# Patient Record
Sex: FEMALE | Race: OTHER | NOT HISPANIC OR LATINO | URBAN - METROPOLITAN AREA
[De-identification: names, ages, dates, MRNs, and addresses within clinical notes are randomized per-mention and may not be internally consistent; named-entity substitution may affect disease eponyms.]

---

## 2017-07-01 ENCOUNTER — OUTPATIENT (OUTPATIENT)
Dept: OUTPATIENT SERVICES | Facility: HOSPITAL | Age: 82
LOS: 1 days | End: 2017-07-01
Payer: MEDICAID

## 2017-07-18 DIAGNOSIS — R69 ILLNESS, UNSPECIFIED: ICD-10-CM

## 2017-09-01 PROCEDURE — G9001: CPT

## 2019-04-01 ENCOUNTER — OUTPATIENT (OUTPATIENT)
Dept: OUTPATIENT SERVICES | Facility: HOSPITAL | Age: 84
LOS: 1 days | End: 2019-04-01
Payer: MEDICARE

## 2019-04-08 ENCOUNTER — INPATIENT (INPATIENT)
Facility: HOSPITAL | Age: 84
LOS: 7 days | Discharge: HOME | DRG: 234 | End: 2019-04-16
Attending: THORACIC SURGERY (CARDIOTHORACIC VASCULAR SURGERY) | Admitting: STUDENT IN AN ORGANIZED HEALTH CARE EDUCATION/TRAINING PROGRAM
Payer: MEDICARE

## 2019-04-08 VITALS
RESPIRATION RATE: 18 BRPM | DIASTOLIC BLOOD PRESSURE: 69 MMHG | SYSTOLIC BLOOD PRESSURE: 141 MMHG | HEART RATE: 70 BPM | OXYGEN SATURATION: 98 %

## 2019-04-08 PROCEDURE — 99285 EMERGENCY DEPT VISIT HI MDM: CPT | Mod: GC,25

## 2019-04-08 PROCEDURE — 93010 ELECTROCARDIOGRAM REPORT: CPT

## 2019-04-09 DIAGNOSIS — Z29.9 ENCOUNTER FOR PROPHYLACTIC MEASURES, UNSPECIFIED: ICD-10-CM

## 2019-04-09 DIAGNOSIS — I25.10 ATHEROSCLEROTIC HEART DISEASE OF NATIVE CORONARY ARTERY WITHOUT ANGINA PECTORIS: ICD-10-CM

## 2019-04-09 DIAGNOSIS — R07.89 OTHER CHEST PAIN: ICD-10-CM

## 2019-04-09 DIAGNOSIS — E11.9 TYPE 2 DIABETES MELLITUS WITHOUT COMPLICATIONS: ICD-10-CM

## 2019-04-09 DIAGNOSIS — I21.4 NON-ST ELEVATION (NSTEMI) MYOCARDIAL INFARCTION: ICD-10-CM

## 2019-04-09 DIAGNOSIS — Z90.710 ACQUIRED ABSENCE OF BOTH CERVIX AND UTERUS: Chronic | ICD-10-CM

## 2019-04-09 LAB
ALBUMIN SERPL ELPH-MCNC: 3.8 G/DL — SIGNIFICANT CHANGE UP (ref 3.3–5)
ALP SERPL-CCNC: 95 U/L — SIGNIFICANT CHANGE UP (ref 40–120)
ALT FLD-CCNC: 26 U/L — SIGNIFICANT CHANGE UP (ref 10–45)
ANION GAP SERPL CALC-SCNC: 14 MMOL/L — SIGNIFICANT CHANGE UP (ref 5–17)
APTT BLD: 185.3 SEC — CRITICAL HIGH (ref 27.5–36.3)
APTT BLD: 36.3 SEC — SIGNIFICANT CHANGE UP (ref 27.5–36.3)
APTT BLD: >200 SEC — CRITICAL HIGH (ref 27.5–36.3)
AST SERPL-CCNC: 50 U/L — HIGH (ref 10–40)
BILIRUB SERPL-MCNC: 0.2 MG/DL — SIGNIFICANT CHANGE UP (ref 0.2–1.2)
BLD GP AB SCN SERPL QL: NEGATIVE — SIGNIFICANT CHANGE UP
BUN SERPL-MCNC: 12 MG/DL — SIGNIFICANT CHANGE UP (ref 7–23)
CALCIUM SERPL-MCNC: 9.6 MG/DL — SIGNIFICANT CHANGE UP (ref 8.4–10.5)
CHLORIDE SERPL-SCNC: 101 MMOL/L — SIGNIFICANT CHANGE UP (ref 96–108)
CO2 SERPL-SCNC: 24 MMOL/L — SIGNIFICANT CHANGE UP (ref 22–31)
CREAT SERPL-MCNC: 0.8 MG/DL — SIGNIFICANT CHANGE UP (ref 0.5–1.3)
GLUCOSE SERPL-MCNC: 146 MG/DL — HIGH (ref 70–99)
HCT VFR BLD CALC: 33.3 % — LOW (ref 34.5–45)
HCT VFR BLD CALC: 34.2 % — LOW (ref 34.5–45)
HGB BLD-MCNC: 10.7 G/DL — LOW (ref 11.5–15.5)
HGB BLD-MCNC: 10.8 G/DL — LOW (ref 11.5–15.5)
INR BLD: 1.07 RATIO — SIGNIFICANT CHANGE UP (ref 0.88–1.16)
MCHC RBC-ENTMCNC: 28 PG — SIGNIFICANT CHANGE UP (ref 27–34)
MCHC RBC-ENTMCNC: 28.5 PG — SIGNIFICANT CHANGE UP (ref 27–34)
MCHC RBC-ENTMCNC: 31.6 GM/DL — LOW (ref 32–36)
MCHC RBC-ENTMCNC: 32.1 GM/DL — SIGNIFICANT CHANGE UP (ref 32–36)
MCV RBC AUTO: 88.7 FL — SIGNIFICANT CHANGE UP (ref 80–100)
MCV RBC AUTO: 88.8 FL — SIGNIFICANT CHANGE UP (ref 80–100)
PA ADP PRP-ACNC: 163 PRU — LOW (ref 194–417)
PA ADP PRP-ACNC: 180 PRU — LOW (ref 194–417)
PLATELET # BLD AUTO: 249 K/UL — SIGNIFICANT CHANGE UP (ref 150–400)
PLATELET # BLD AUTO: 250 K/UL — SIGNIFICANT CHANGE UP (ref 150–400)
POTASSIUM SERPL-MCNC: 4.6 MMOL/L — SIGNIFICANT CHANGE UP (ref 3.5–5.3)
POTASSIUM SERPL-SCNC: 4.6 MMOL/L — SIGNIFICANT CHANGE UP (ref 3.5–5.3)
PROT SERPL-MCNC: 6.9 G/DL — SIGNIFICANT CHANGE UP (ref 6–8.3)
PROTHROM AB SERPL-ACNC: 12.3 SEC — SIGNIFICANT CHANGE UP (ref 10–12.9)
RBC # BLD: 3.75 M/UL — LOW (ref 3.8–5.2)
RBC # BLD: 3.86 M/UL — SIGNIFICANT CHANGE UP (ref 3.8–5.2)
RBC # FLD: 11.5 % — SIGNIFICANT CHANGE UP (ref 10.3–14.5)
RBC # FLD: 11.7 % — SIGNIFICANT CHANGE UP (ref 10.3–14.5)
RH IG SCN BLD-IMP: POSITIVE — SIGNIFICANT CHANGE UP
SODIUM SERPL-SCNC: 139 MMOL/L — SIGNIFICANT CHANGE UP (ref 135–145)
TROPONIN T, HIGH SENSITIVITY RESULT: 548 NG/L — HIGH (ref 0–51)
TROPONIN T, HIGH SENSITIVITY RESULT: 566 NG/L — HIGH (ref 0–51)
WBC # BLD: 5.4 K/UL — SIGNIFICANT CHANGE UP (ref 3.8–10.5)
WBC # BLD: 6.5 K/UL — SIGNIFICANT CHANGE UP (ref 3.8–10.5)
WBC # FLD AUTO: 5.4 K/UL — SIGNIFICANT CHANGE UP (ref 3.8–10.5)
WBC # FLD AUTO: 6.5 K/UL — SIGNIFICANT CHANGE UP (ref 3.8–10.5)

## 2019-04-09 PROCEDURE — 99222 1ST HOSP IP/OBS MODERATE 55: CPT | Mod: 24

## 2019-04-09 PROCEDURE — 99223 1ST HOSP IP/OBS HIGH 75: CPT | Mod: GC

## 2019-04-09 PROCEDURE — 99223 1ST HOSP IP/OBS HIGH 75: CPT

## 2019-04-09 PROCEDURE — 99152 MOD SED SAME PHYS/QHP 5/>YRS: CPT | Mod: GC

## 2019-04-09 PROCEDURE — 71045 X-RAY EXAM CHEST 1 VIEW: CPT | Mod: 26

## 2019-04-09 PROCEDURE — 99221 1ST HOSP IP/OBS SF/LOW 40: CPT

## 2019-04-09 PROCEDURE — 93458 L HRT ARTERY/VENTRICLE ANGIO: CPT | Mod: 26,GC

## 2019-04-09 PROCEDURE — 93306 TTE W/DOPPLER COMPLETE: CPT | Mod: 26

## 2019-04-09 RX ORDER — SODIUM CHLORIDE 9 MG/ML
1000 INJECTION, SOLUTION INTRAVENOUS
Qty: 0 | Refills: 0 | Status: DISCONTINUED | OUTPATIENT
Start: 2019-04-09 | End: 2019-04-12

## 2019-04-09 RX ORDER — HEPARIN SODIUM 5000 [USP'U]/ML
INJECTION INTRAVENOUS; SUBCUTANEOUS
Qty: 25000 | Refills: 0 | Status: DISCONTINUED | OUTPATIENT
Start: 2019-04-09 | End: 2019-04-09

## 2019-04-09 RX ORDER — DEXTROSE 50 % IN WATER 50 %
15 SYRINGE (ML) INTRAVENOUS ONCE
Qty: 0 | Refills: 0 | Status: DISCONTINUED | OUTPATIENT
Start: 2019-04-09 | End: 2019-04-12

## 2019-04-09 RX ORDER — DEXTROSE 50 % IN WATER 50 %
25 SYRINGE (ML) INTRAVENOUS ONCE
Qty: 0 | Refills: 0 | Status: DISCONTINUED | OUTPATIENT
Start: 2019-04-09 | End: 2019-04-12

## 2019-04-09 RX ORDER — CLOPIDOGREL BISULFATE 75 MG/1
75 TABLET, FILM COATED ORAL DAILY
Qty: 0 | Refills: 0 | Status: DISCONTINUED | OUTPATIENT
Start: 2019-04-09 | End: 2019-04-09

## 2019-04-09 RX ORDER — ATORVASTATIN CALCIUM 80 MG/1
80 TABLET, FILM COATED ORAL AT BEDTIME
Qty: 0 | Refills: 0 | Status: DISCONTINUED | OUTPATIENT
Start: 2019-04-09 | End: 2019-04-12

## 2019-04-09 RX ORDER — METOPROLOL TARTRATE 50 MG
12.5 TABLET ORAL
Qty: 0 | Refills: 0 | Status: DISCONTINUED | OUTPATIENT
Start: 2019-04-09 | End: 2019-04-12

## 2019-04-09 RX ORDER — ASPIRIN/CALCIUM CARB/MAGNESIUM 324 MG
81 TABLET ORAL DAILY
Qty: 0 | Refills: 0 | Status: DISCONTINUED | OUTPATIENT
Start: 2019-04-09 | End: 2019-04-12

## 2019-04-09 RX ORDER — INSULIN LISPRO 100/ML
VIAL (ML) SUBCUTANEOUS
Qty: 0 | Refills: 0 | Status: DISCONTINUED | OUTPATIENT
Start: 2019-04-09 | End: 2019-04-12

## 2019-04-09 RX ORDER — GLUCAGON INJECTION, SOLUTION 0.5 MG/.1ML
1 INJECTION, SOLUTION SUBCUTANEOUS ONCE
Qty: 0 | Refills: 0 | Status: DISCONTINUED | OUTPATIENT
Start: 2019-04-09 | End: 2019-04-12

## 2019-04-09 RX ORDER — INSULIN LISPRO 100/ML
VIAL (ML) SUBCUTANEOUS AT BEDTIME
Qty: 0 | Refills: 0 | Status: DISCONTINUED | OUTPATIENT
Start: 2019-04-09 | End: 2019-04-12

## 2019-04-09 RX ORDER — HEPARIN SODIUM 5000 [USP'U]/ML
700 INJECTION INTRAVENOUS; SUBCUTANEOUS
Qty: 25000 | Refills: 0 | Status: DISCONTINUED | OUTPATIENT
Start: 2019-04-09 | End: 2019-04-12

## 2019-04-09 RX ORDER — DEXTROSE 50 % IN WATER 50 %
12.5 SYRINGE (ML) INTRAVENOUS ONCE
Qty: 0 | Refills: 0 | Status: DISCONTINUED | OUTPATIENT
Start: 2019-04-09 | End: 2019-04-12

## 2019-04-09 RX ORDER — HEPARIN SODIUM 5000 [USP'U]/ML
4400 INJECTION INTRAVENOUS; SUBCUTANEOUS EVERY 6 HOURS
Qty: 0 | Refills: 0 | Status: DISCONTINUED | OUTPATIENT
Start: 2019-04-09 | End: 2019-04-12

## 2019-04-09 RX ADMIN — HEPARIN SODIUM 0 UNIT(S)/HR: 5000 INJECTION INTRAVENOUS; SUBCUTANEOUS at 05:20

## 2019-04-09 RX ADMIN — Medication 81 MILLIGRAM(S): at 18:02

## 2019-04-09 RX ADMIN — HEPARIN SODIUM 900 UNIT(S)/HR: 5000 INJECTION INTRAVENOUS; SUBCUTANEOUS at 01:14

## 2019-04-09 RX ADMIN — ATORVASTATIN CALCIUM 80 MILLIGRAM(S): 80 TABLET, FILM COATED ORAL at 21:45

## 2019-04-09 RX ADMIN — HEPARIN SODIUM 700 UNIT(S)/HR: 5000 INJECTION INTRAVENOUS; SUBCUTANEOUS at 22:52

## 2019-04-09 RX ADMIN — Medication 12.5 MILLIGRAM(S): at 18:02

## 2019-04-09 RX ADMIN — Medication 12.5 MILLIGRAM(S): at 08:50

## 2019-04-09 NOTE — ED ADULT NURSE REASSESSMENT NOTE - COMFORT CARE
warm blanket provided/side rails up/treatment delay explained/wait time explained/plan of care explained

## 2019-04-09 NOTE — ED PROVIDER NOTE - OBJECTIVE STATEMENT
83F with HTN, HLD, DM presented to [facility] with intermittent midsternal chest pain for a few days PTA. Associated with SOB. Patient had EKG done at [place] showing STD in I, II, III, avF, V4-V6 and patient was transferred to [facility] for cardiac eval. Patient arrived on heparin drip. 83F with HTN, HLD, DM presented to [facility] with intermittent midsternal chest pain for a few days PTA. Patient had EKG done at [place] showing STD in I, II, III, avF, V4-V6 and patient was transferred to [facility] for cardiac eval. Patient arrived on heparin drip. Patient states chest pain is substernal and only occurred when carrying heavy vegetables. She would then rest and the pain would go away. It has been happening for a few days, does not radiate anywhere. No associated SOB, lightheadedness, dizziness, palpitations, N/V. She currently reports no chest pain.

## 2019-04-09 NOTE — H&P ADULT - ATTENDING COMMENTS
Care to be assumed by day hospitalist at 8 am.  This patient was assigned to me by the hospitalist in charge; my involvement in this case has consisted of the initial history, physical, chart review, and management plan.  This patient was previously unknown to me.

## 2019-04-09 NOTE — ED PROVIDER NOTE - PHYSICAL EXAMINATION
GENERAL: NAD, well-developed  HEAD:  Atraumatic, Normocephalic  EYES: EOMI, PERRLA, conjunctiva and sclera clear  NECK: Supple, No JVD  CHEST/LUNG: Clear to auscultation bilaterally; No wheezes or rales  HEART: Regular rate and rhythm; No murmurs, rubs, or gallops  ABDOMEN: Soft, Nontender, Nondistended; Bowel sounds present  EXTREMITIES:  2+ Peripheral Pulses, No clubbing, cyanosis, or edema  PSYCH: AAOx3  NEUROLOGY: non-focal  SKIN: No rashes or lesions

## 2019-04-09 NOTE — ED ADULT NURSE REASSESSMENT NOTE - NS ED NURSE REASSESS COMMENT FT1
Heparin bag rescanned, infusion paused for 1 hour and decreased rate by 2ml/hr as per ACS Heparin Nomogram. Patient tolerating infusion without distress, no active chest pain. Resting comfortably in stretcher. Safety maintained.

## 2019-04-09 NOTE — H&P ADULT - HISTORY OF PRESENT ILLNESS
83 F PMH T2DM, HLD, Vit D deficiency, p/w one episode of acute onset chest pain today. Mid sternal, non radiating, sharp, precipitated by exertion (pt was carrying heavy grocery bags) and relieved by rest. Never had similar sx before. Denies associated sob, palpitations, LE edema, orthopnea.  NO prior provocative testing, no prior aspirin use. Unk baseline ekg. +FH early CAD with MI (brother, mother both with MI in 50s). Lifelong nonsmoker, no etoh or other toxic habits. Pt presented to [facility] where EKG showed signs of inferolateral ischemia, and troponin was elevated. s/p aspirin load, plavix load, hep gtt, and transferred to [facility] for cards eval and cardiac cath. Currently chest pain free, resting comfortably.     VS: 98.0, 70, 129/74, 18, 97% ra. Labs: anemia no baseline, rest cbc unrevealing, PTT initially supratherapeutic, repeat within range,  --> 548, cmp with hyperglycemia otherwise unrevealing.  CXR prelim clear lungs.

## 2019-04-09 NOTE — CONSULT NOTE ADULT - SUBJECTIVE AND OBJECTIVE BOX
CHIEF COMPLAINT: Chest pain    HISTORY OF PRESENT ILLNESS: 84 yo Turk[place] female w h/o DM2, HLD, family h/o CAD, transfer to [facility] for chest pain, abnormal EKG, elevated troponin. Pt reports intermittent chest pain and throat burning over past two days. Worse with exertion, relieved with rest. No dyspnea, dizziness, syncope, palpitations or peripheral edema. Denies any prior cardiac history or workup. Asymptomatic at this time. At outside hospital noted to have abnormal EKG and elevated troponin, transferred to [facility].      Allergies    No Known Allergies    Intolerances    	    MEDICATIONS:  heparin  Infusion.  Unit(s)/Hr IV Continuous <Continuous>  heparin  Injectable 4400 Unit(s) IV Push every 6 hours PRN                  PAST MEDICAL & SURGICAL HISTORY:  Hyperlipidemia  Diabetes mellitus  H/O abdominal hysterectomy      FAMILY HISTORY: CAD in brother and mother      SOCIAL HISTORY:    Denies tobacco, alcohol or drug use      REVIEW OF SYSTEMS:  General: no fatigue/malaise, weight loss/gain.  Skin: no rashes.  Ophthalmologic: no blurred vision, no loss of vision. 	  ENT: no sore throat, rhinorrhea, sinus congestion.  Respiratory: no SOB, cough or wheeze.  Gastrointestinal:  no N/V/D, no melena/hematemesis/hematochezia.  Genitourinary: no dysuria/hesitancy or hematuria.  Musculoskeletal: no myalgias or arthralgias.  Neurological: no changes in vision or hearing, no lightheadedness/dizziness, no syncope/near syncope	  Psychiatric: no unusual stress/anxiety.   Hematology/Lymphatics: no unusual bleeding, bruising and no lymphadenopathy.  Endocrine: no unusual thirst.   All others negative except as stated above and in HPI.    PHYSICAL EXAM:  T(C): 36.7 (04-09-19 @ 01:14), Max: 36.7 (04-09-19 @ 01:14)  HR: 70 (04-09-19 @ 01:14) (70 - 70)  BP: 129/74 (04-09-19 @ 01:14) (129/74 - 141/69)  RR: 18 (04-09-19 @ 01:14) (18 - 18)  SpO2: 97% (04-09-19 @ 01:14) (97% - 98%)  Wt(kg): --  I&O's Summary      Appearance: No distress	  HEENT:   Normal oral mucosa, PERRL, EOMI	  Lymphatic: No lymphadenopathy  Cardiovascular: Normal S1 S2, No JVD, No murmurs, No edema  Respiratory: Lungs clear to auscultation	  Psychiatry: A & O x 3, Mood & affect appropriate  Gastrointestinal:  Soft, Non-tender, + BS	  Skin: No rashes, No ecchymoses, No cyanosis	  Neurologic: Non-focal  Extremities: Normal range of motion, No clubbing, cyanosis or edema  Vascular: Peripheral pulses palpable 2+ bilaterally        LABS:	 	    CBC Full  -  ( 09 Apr 2019 01:20 )  WBC Count : 6.5 K/uL  Hemoglobin : 10.7 g/dL  Hematocrit : 33.3 %  Platelet Count - Automated : 250 K/uL  Mean Cell Volume : 88.8 fl  Mean Cell Hemoglobin : 28.5 pg  Mean Cell Hemoglobin Concentration : 32.1 gm/dL  Auto Neutrophil # : x  Auto Lymphocyte # : x  Auto Monocyte # : x  Auto Eosinophil # : x  Auto Basophil # : x  Auto Neutrophil % : x  Auto Lymphocyte % : x  Auto Monocyte % : x  Auto Eosinophil % : x  Auto Basophil % : x    04-09    139  |  101  |  12  ----------------------------<  146<H>  4.6   |  24  |  0.80    Ca    9.6      09 Apr 2019 01:20    TPro  6.9  /  Alb  3.8  /  TBili  0.2  /  DBili  x   /  AST  50<H>  /  ALT  26  /  AlkPhos  95  04-09      proBNP:   Lipid Profile:   HgA1c:   TSH:       CARDIAC MARKERS:            TELEMETRY: 	    ECG:  	At other hospital: Sinus HR 76 1st degree AV block 1mm ST elevation aVR ST depression I, II, III, aVL, aVF, V4-V6. EKG here: Sinus HR 67 1st degree AV block ST depression II, V4-V6  RADIOLOGY:  OTHER: 	    PREVIOUS DIAGNOSTIC TESTING:    [ ] Echocardiogram:   [ ]  Catheterization:   [ ] Stress Test:

## 2019-04-09 NOTE — CONSULT NOTE ADULT - ATTENDING COMMENTS
83 year old woman with unstable angina, elevated troponin, pain currently resolved, on Heparin infusion, DAPT, needs coronary angiography.

## 2019-04-09 NOTE — H&P ADULT - PROBLEM SELECTOR PLAN 1
-no current chest pain, some dynamic resolution of ST changes on EKG though still abnormal, HST appear to have peaked  -MITZY 4, HEART score 10.   -admit to tele  -cards recs appreciated: plan for C in am.  -c/w aspirin 81 qd, plavix 75 qd, heparin acs protocol  -start lipitor 80, metoprolol 12.5 bid  -trend ce  -check TTE  -check a1c, tsh, lipids

## 2019-04-09 NOTE — CONSULT NOTE ADULT - SUBJECTIVE AND OBJECTIVE BOX
History of Present Illness:  HPI:  83 F PMH T2DM, HLD, Vit D deficiency, p/w one episode of acute onset chest pain today. Mid sternal, non radiating, sharp, precipitated by exertion (pt was carrying heavy grocery bags) and relieved by rest. Never had similar sx before. Denies associated sob, palpitations, LE edema, orthopnea.  NO prior provocative testing, no prior aspirin use. Unk baseline ekg. +FH early CAD with MI (brother, mother both with MI in 50s). Lifelong nonsmoker, no etoh or other toxic habits. Pt presented to [facility] where EKG showed signs of inferolateral ischemia, and troponin was elevated. s/p aspirin load, plavix load, hep gtt, and transferred to [facility] for cards eval and cardiac cath. Currently chest pain free, resting comfortably.     VS: 98.0, 70, 129/74, 18, 97% ra. Labs: anemia no baseline, rest cbc unrevealing, PTT initially supratherapeutic, repeat within range,  --> 548, cmp with hyperglycemia otherwise unrevealing.  CXR prelim clear lungs. (09 Apr 2019 05:31)       Past Medical History  Hyperlipidemia  Diabetes mellitus      Past Surgical History  H/O abdominal hysterectomy      MEDICATIONS  (STANDING):  aspirin enteric coated 81 milliGRAM(s) Oral daily  atorvastatin 80 milliGRAM(s) Oral at bedtime  dextrose 5%. 1000 milliLiter(s) (50 mL/Hr) IV Continuous <Continuous>  dextrose 50% Injectable 12.5 Gram(s) IV Push once  dextrose 50% Injectable 25 Gram(s) IV Push once  dextrose 50% Injectable 25 Gram(s) IV Push once  heparin  Infusion.  Unit(s)/Hr (9 mL/Hr) IV Continuous <Continuous>  insulin lispro (HumaLOG) corrective regimen sliding scale   SubCutaneous three times a day before meals  insulin lispro (HumaLOG) corrective regimen sliding scale   SubCutaneous at bedtime  metoprolol tartrate 12.5 milliGRAM(s) Oral two times a day    MEDICATIONS  (PRN):  dextrose 40% Gel 15 Gram(s) Oral once PRN Blood Glucose LESS THAN 70 milliGRAM(s)/deciliter  glucagon  Injectable 1 milliGRAM(s) IntraMuscular once PRN Glucose LESS THAN 70 milligrams/deciliter  heparin  Injectable 4400 Unit(s) IV Push every 6 hours PRN For aPTT less than 40      Vital Signs Last 24 Hrs  T(C): 36.7 (04-09-19 @ 08:46), Max: 36.7 (04-09-19 @ 01:14)  T(F): 98 (04-09-19 @ 08:46), Max: 98 (04-09-19 @ 01:14)  HR: 65 (04-09-19 @ 08:46) (65 - 70)  BP: 139/72 (04-09-19 @ 08:46) (129/74 - 150/79)  RR: 20 (04-09-19 @ 08:46) (16 - 20)  SpO2: 100% (04-09-19 @ 08:46) (97% - 100%)           Daily     Daily   Admit Wt: Drug Dosing Weight    Weight (kg): 74.2 (09 Apr 2019 01:01)    Allergies: No Known Allergies      SOCIAL HISTORY:  Smoker: [ ] Yes  [X] No                 Pt denies  ETOH use: [ ] Yes  [X] No             Pt denies  Ilicit Drug use:  [ ] Yes  [X] No     Pt denies    FAMILY HISTORY:  FH: myocardial infarction: mother, brother      Review of Systems  GENERAL:  no weakness, fatigue, fevers or chills  NEURO: no dizziness, numbness, tingling or weakness  SKIN: no itching, burning, rashes, or lesions   HEENT: no visual changes;  no headache, no vertigo, no recent colds  RESPIRATORY: no shortness of breath, no cough, sputum, wheezing  CARDIOVASCULAR:  no chest pain,  or palpitations  GI: no abd pain. no N/V/D.  PERIPHERAL VASCULAR: no swelling, no tenderness, no erythema    PHYSICAL EXAM  General: Well nourished, well developed, NAD.                                              Neuro: Normal exam oriented to person/place & time with no focal motor or sensory  deficits.                    Eyes: Normal exam of conjunctiva & lids, pupils equally reactive.   ENT: Normal exam of nasal/oral mucosa with absence of cyanosis.   Neck: Normal exam of jugular veins, trachea & thyroid.   Chest: Normal lung exam with good air movement absence of wheezes, rales, or rhonchi.                                                                         CV:  Auscultation: normal S1S2, RRR   Carotids: No Bruits[X]  Abdominal Aorta: normal [X] nonpalpable[X]                                                                         GI: Normal exam of abdomen with no noted masses or tenderness. +BSx4Q                                                                                            Extremities: Normal no evidence of cyanosis or deformity, Edema: none  Lower Extremity Pulses: Right[+2DP] Left[+2DP] Varicosities[none]  SKIN : Normal exam to inspection & palpation.                                                           LABS:                        10.8   5.4   )-----------( 249      ( 09 Apr 2019 07:24 )             34.2     139  |  101  |  12  ----------------------------<  146<H>  4.6   |  24  |  0.80    AST  50<H>  /  ALT  26  /  AlkPhos  95  04-09    PT/INR/PTT - ( 09 Apr 2019 01:20 )   PT: 12.3 sec;   INR: 1.07 ratio   PTT:185.3 sec      Cardiac Cath:    TTE / JORGE: History of Present Illness:  84 y/o Female with PMH T2DM, HLD, Vit D deficiency, p/w one episode of acute onset chest pain today. Mid sternal, non radiating, sharp, precipitated by exertion (pt was carrying heavy grocery bags) and relieved by rest. Never had similar sx before. Denies associated sob, palpitations, LE edema, orthopnea.  NO prior provocative testing, no prior aspirin use. Unk baseline ekg. +FH early CAD with MI (brother, mother both with MI in 50s). Lifelong nonsmoker, no etoh or other toxic habits. Pt presented to [facility] where EKG showed signs of inferolateral ischemia, and troponin was elevated. s/p aspirin load, plavix load, hep gtt, and transferred to [facility] for cards eval and cardiac cath. Currently chest pain free, resting comfortably.   VS: 98.0, 70, 129/74, 18, 97% ra. Labs: anemia no baseline, rest cbc unrevealing, PTT initially supratherapeutic, repeat within range,  --> 548, cmp with hyperglycemia otherwise unrevealing.  CXR prelim clear lungs. (09 Apr 2019 05:31)       Past Medical History  Hyperlipidemia  Diabetes mellitus      Past Surgical History  H/O abdominal hysterectomy      MEDICATIONS  (STANDING):  aspirin enteric coated 81 milliGRAM(s) Oral daily  atorvastatin 80 milliGRAM(s) Oral at bedtime  heparin  Infusion.  Unit(s)/Hr (9 mL/Hr) IV Continuous <Continuous>  insulin lispro (HumaLOG) corrective regimen sliding scale   SubCutaneous three times a day before meals  insulin lispro (HumaLOG) corrective regimen sliding scale   SubCutaneous at bedtime  metoprolol tartrate 12.5 milliGRAM(s) Oral two times a day    MEDICATIONS  (PRN):  dextrose 40% Gel 15 Gram(s) Oral once PRN Blood Glucose LESS THAN 70 milliGRAM(s)/deciliter  glucagon  Injectable 1 milliGRAM(s) IntraMuscular once PRN Glucose LESS THAN 70 milligrams/deciliter  heparin  Injectable 4400 Unit(s) IV Push every 6 hours PRN For aPTT less than 40      Vital Signs Last 24 Hrs  T(C): 36.7 (04-09-19 @ 08:46), Max: 36.7 (04-09-19 @ 01:14)  T(F): 98 (04-09-19 @ 08:46), Max: 98 (04-09-19 @ 01:14)  HR: 65 (04-09-19 @ 08:46) (65 - 70)  BP: 139/72 (04-09-19 @ 08:46) (129/74 - 150/79)  RR: 20 (04-09-19 @ 08:46) (16 - 20)  SpO2: 100% (04-09-19 @ 08:46) (97% - 100%)             Weight (kg): 74.2 (09 Apr 2019 01:01)    Allergies: No Known Allergies      SOCIAL HISTORY:  Lives with daughter and son in law  Smoker: [ ] Yes  [X] No                 Pt denies  ETOH use: [ ] Yes  [X] No             Pt denies  Ilicit Drug use:  [ ] Yes  [X] No     Pt denies    FAMILY HISTORY:  FH: myocardial infarction: mother, brother      Review of Systems  GENERAL:  no weakness, fatigue, fevers or chills  NEURO: no dizziness, numbness, tingling or weakness  SKIN: no itching, burning, rashes, or lesions   HEENT: no visual changes;  no headache, no vertigo, no recent colds  RESPIRATORY: no shortness of breath, no cough, sputum, wheezing  CARDIOVASCULAR:  no chest pain,  or palpitations  GI: no abd pain. no N/V/D.  PERIPHERAL VASCULAR: no swelling, no tenderness, no erythema    PHYSICAL EXAM  General: Well nourished, well developed, NAD.                                              Neuro: Normal exam oriented to person/place & time with no focal motor or sensory  deficits.                    Eyes: Normal exam of conjunctiva & lids, pupils equally reactive.   ENT: Normal exam of nasal/oral mucosa with absence of cyanosis.   Neck: Normal exam of jugular veins, trachea & thyroid.   Chest: Normal lung exam with good air movement absence of wheezes, rales, or rhonchi.                                                                         CV:  Auscultation: normal S1S2, RRR   Carotids: No Bruits[X]  Abdominal Aorta: normal [X] nonpalpable[X]                                                                         GI: Normal exam of abdomen with no noted masses or tenderness. +BSx4Q                                                                                            Extremities: Normal no evidence of cyanosis or deformity, Edema: none  Lower Extremity Pulses: Right[+2DP] Left[+2DP] Varicosities[none]  SKIN : Normal exam to inspection & palpation. Right radial incision w/radial band CDI                                                          LABS:                        10.8   5.4   )-----------( 249      ( 09 Apr 2019 07:24 )             34.2     139  |  101  |  12  ----------------------------<  146<H>  4.6   |  24  |  0.80    AST  50<H>  /  ALT  26  /  AlkPhos  95  04-09    PT/INR/PTT - ( 09 Apr 2019 01:20 )   PT: 12.3 sec;   INR: 1.07 ratio   PTT:185.3 sec      < from: Cardiac Cath Lab - Adult (04.09.19 @ 11:26) >  LM:   --  LM: Angiography showed minor luminal irregularities with no flow  limiting lesions.  LAD:   --  Proximal LAD: There was a 99 % stenosis.  CX:   --Mid circumflex: There was a 90 % stenosis.  RCA:   --  RCA: Angiography showed minor luminal irregularities with no  flow limiting lesions.      TTE: pending

## 2019-04-09 NOTE — CHART NOTE - NSCHARTNOTEFT_GEN_A_CORE
Patient was seen and examined by me at bedside. I agree with nocturnist note, subjective, objective physical exam, assessment and plan with following modifications/additions.      Patient found to have acute NSTEMI- Catheterization revealed LAD:   --  Proximal LAD: There was a 99 % stenosis.  CX:   --  Mid circumflex: There was a 90 % stenosis.    CT surgery consulted for evaluation for CABG.  Given patient received Plavix will need to check response, before making decision about when procedure can be done.      Heparin to restarted at 9 pm.  No bleeding from radial access site

## 2019-04-09 NOTE — H&P ADULT - NSHPLABSRESULTS_GEN_ALL_CORE
Labs personally reviewed  anemia no baseline, rest cbc unrevealing, PTT initially supratherapeutic, repeat within range,  --> 548, cmp with hyperglycemia otherwise unrevealing.   Imaging personally reviewed  CXR prelim clear lungs.   OSH personally reviewed: Sinus HR 76 1st degree AV block 1mm ST elevation aVR ST depression I, II, III, aVL, aVF, V4-V6  In house EKG personally reviewed Sinus HR 67 1st degree AV block ST depression II, V4-V6 with AVR ROMINA resolved.

## 2019-04-09 NOTE — H&P ADULT - NSHPPHYSICALEXAM_GEN_ALL_CORE
PHYSICAL EXAM:  GENERAL: NAD, well-groomed, well-developed  HEAD:  Atraumatic, Normocephalic  EYES: EOMI, PERRLA, conjunctiva and sclera clear  ENMT: No tonsillar erythema, exudates, or enlargement; Moist mucous membranes, Good dentition, No lesions  NECK: Supple, No JVD, Normal thyroid  CHEST/LUNG: Clear to percussion bilaterally; No rales, rhonchi, wheezing, or rubs no resp distress or acc muscle usage, talking in full sentences  HEART: Regular rate and rhythm; No murmurs, rubs, or gallops no sig Le edema  ABDOMEN: Soft, Nontender, Nondistended; Bowel sounds present no rebound/guarding, neg kim  EXTREMITIES:  2+ Peripheral Pulses, No clubbing, cyanosis, rom intact  LYMPH: No lymphadenopathy noted, no lymphangitis  SKIN: No rashes or lesions, no excoriations  NERVOUS SYSTEM:  Alert & Oriented X3, Good concentration; Motor Strength 5/5 B/L upper and lower extremities

## 2019-04-09 NOTE — H&P ADULT - NSHPREVIEWOFSYSTEMS_GEN_ALL_CORE
REVIEW OF SYSTEMS:  CONSTITUTIONAL: No weakness. No fevers. No chills. No weight loss. Good appetite.  EYES: No visual changes. No eye pain.  ENT: No hearing difficulty. No vertigo. No dysphagia. No Sinusitis/rhinorrhea.  NECK: No pain. No stiffness/rigidity.  CARDIAC: resolved chest pain. No palpitations.  RESPIRATORY: No cough. No SOB. No hemoptysis.  GASTROINTESTINAL: No abdominal pain. No nausea. No vomiting. No hematemesis. No diarrhea. No constipation. No melena. No hematochezia.  GENITOURINARY: No dysuria. No frequency. No hesitancy. No hematuria.  NEUROLOGICAL: No numbness. No focal weakness. No incontinence. No headache.  BACK: No back pain.  EXTREMITIES: No lower extremity edema. Full ROM.  SKIN: No rashes. No itching. No other lesions.  PSYCHIATRIC: No depression. No anxiety. No SI/HI.  ALLERGIC: No lip swelling. No hives.  All other review of systems is negative unless indicated above.

## 2019-04-09 NOTE — ED ADULT NURSE NOTE - OBJECTIVE STATEMENT
82 y/o female with PMH diabetes, HTN, high cholesterol, arrives to ED by EMS transferred from [facility] for ST depressions on EKG. Patient reports having midsternal chest pain, non radiating, earlier in the day, with associated shortness of breath prompting patient to go to [facility]. Patient initiated on Heparin Infusion at transferring hospital arrives with pump set to 6.9 ml/hr. Patient reweighed upon arrival. Heparin infusion ordered by ED MD, dosage adjusted per patients weight using ACS Heparin Nomogram. Patient is a/ox4, denies having active chest pain, patient well appearing. No palpitations, abdomen pain, n/v/d. VSS, afebrile. IV 20g to right AC from [facility]. CM in place HR 70's NSR.

## 2019-04-09 NOTE — CONSULT NOTE ADULT - PROBLEM SELECTOR RECOMMENDATION 9
Continue asa 81 daily, lopressor 12.5 BID, atorvastatin 80 HS  Titrate up beta-blocker as tolerated  Continue pre-op cardiac surgery workup  Check TTE/US Carotid/PFTs  Check STAT P2Y12 - hold Plavix  Continue Heparin drip  Plan for CABG possible Thursday if p2y12 ok

## 2019-04-09 NOTE — CONSULT NOTE ADULT - ASSESSMENT
82 yo Citizen of the [place] female w h/o DM2, HLD, family h/o CAD, transfer to [facility] for chest pain, abnormal EKG, NSTEMI.    - Hemodynamically stable at this time. No chest pain  - At outside hospital received , Plavix 600 and was started on heparin gtt. c/w ASA 81 daily, Plavix 75 daily and heparin gtt  - Plan for cath in AM  - Trend cardiac enzymes  - Monitor on tele  - Start Lipitor 80  - Start Metoprolol 12.5 BID  - If any change in clinical status please call me back to bedside    Nitesh Watkins MD  Cardiology Fellow  [id number]
84 y/o Female with PMHx of T2DM, HLD, Vit D deficiency, who presented to [facility] with c/o acute onset chest pain. +EKG changes s/p aspirin load, plavix load, hep gtt, and transferred to [facility] for cards eval and cardiac cath. 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

## 2019-04-09 NOTE — ED ADULT NURSE NOTE - INTERVENTIONS DEFINITIONS
Monitor gait and stability/[place] to call system/Non-slip footwear when patient is off stretcher/Physically safe environment: no spills, clutter or unnecessary equipment/Call bell, personal items and telephone within reach/Provide visual cue, wrist band, yellow gown, etc./Monitor for mental status changes and reorient to person, place, and time/Stretcher in lowest position, wheels locked, appropriate side rails in place

## 2019-04-09 NOTE — H&P ADULT - ASSESSMENT
83 F PMH T2DM, HLD, Vit D deficiency, p/w one episode of acute onset chest pain today, f/w NSTEMI, pending [facility].

## 2019-04-09 NOTE — ED PROVIDER NOTE - CLINICAL SUMMARY MEDICAL DECISION MAKING FREE TEXT BOX
Mela: 83 year old female with htn, hld, dm presents ot the ER with chest pain from OSH. patient found to have STD with chest pain. will get labs, ekg, cards eval, patient on heparin drip, currently no cp. will admit to hospital.

## 2019-04-09 NOTE — H&P ADULT - NSHPSOCIALHISTORY_GEN_ALL_CORE
Social History:      Occupation: retired      Substance Use (street drugs): (  x) never used  (  ) other:  Tobacco Usage:  (  x ) never smoked   (   ) former smoker   (   ) current smoker  (     ) pack year  (        ) last cigarette date  Alcohol Usage: denies

## 2019-04-10 DIAGNOSIS — Z71.89 OTHER SPECIFIED COUNSELING: ICD-10-CM

## 2019-04-10 PROBLEM — Z00.00 ENCOUNTER FOR PREVENTIVE HEALTH EXAMINATION: Status: ACTIVE | Noted: 2019-04-10

## 2019-04-10 PROBLEM — E11.9 TYPE 2 DIABETES MELLITUS WITHOUT COMPLICATIONS: Chronic | Status: ACTIVE | Noted: 2019-04-09

## 2019-04-10 PROBLEM — E78.5 HYPERLIPIDEMIA, UNSPECIFIED: Chronic | Status: ACTIVE | Noted: 2019-04-09

## 2019-04-10 LAB
ANION GAP SERPL CALC-SCNC: 13 MMOL/L — SIGNIFICANT CHANGE UP (ref 5–17)
APTT BLD: 64.7 SEC — HIGH (ref 27.5–36.3)
APTT BLD: 66.3 SEC — HIGH (ref 27.5–36.3)
APTT BLD: 90.8 SEC — HIGH (ref 27.5–36.3)
BLD GP AB SCN SERPL QL: NEGATIVE — SIGNIFICANT CHANGE UP
BUN SERPL-MCNC: 18 MG/DL — SIGNIFICANT CHANGE UP (ref 7–23)
CALCIUM SERPL-MCNC: 9.7 MG/DL — SIGNIFICANT CHANGE UP (ref 8.4–10.5)
CHLORIDE SERPL-SCNC: 99 MMOL/L — SIGNIFICANT CHANGE UP (ref 96–108)
CHOLEST SERPL-MCNC: 188 MG/DL — SIGNIFICANT CHANGE UP (ref 10–199)
CO2 SERPL-SCNC: 24 MMOL/L — SIGNIFICANT CHANGE UP (ref 22–31)
CREAT SERPL-MCNC: 0.9 MG/DL — SIGNIFICANT CHANGE UP (ref 0.5–1.3)
FIBRINOGEN PPP-MCNC: 614 MG/DL — HIGH (ref 350–510)
GLUCOSE SERPL-MCNC: 172 MG/DL — HIGH (ref 70–99)
HBA1C BLD-MCNC: 8.1 % — HIGH (ref 4–5.6)
HCT VFR BLD CALC: 35.4 % — SIGNIFICANT CHANGE UP (ref 34.5–45)
HDLC SERPL-MCNC: 46 MG/DL — LOW
HGB BLD-MCNC: 11.8 G/DL — SIGNIFICANT CHANGE UP (ref 11.5–15.5)
LIPID PNL WITH DIRECT LDL SERPL: 111 MG/DL — HIGH
MCHC RBC-ENTMCNC: 30 PG — SIGNIFICANT CHANGE UP (ref 27–34)
MCHC RBC-ENTMCNC: 33.3 GM/DL — SIGNIFICANT CHANGE UP (ref 32–36)
MCV RBC AUTO: 90 FL — SIGNIFICANT CHANGE UP (ref 80–100)
MRSA PCR RESULT.: SIGNIFICANT CHANGE UP
NT-PROBNP SERPL-SCNC: 1759 PG/ML — HIGH (ref 0–300)
PA ADP PRP-ACNC: 260 PRU — SIGNIFICANT CHANGE UP (ref 194–417)
PLATELET # BLD AUTO: 252 K/UL — SIGNIFICANT CHANGE UP (ref 150–400)
POTASSIUM SERPL-MCNC: 4.6 MMOL/L — SIGNIFICANT CHANGE UP (ref 3.5–5.3)
POTASSIUM SERPL-SCNC: 4.6 MMOL/L — SIGNIFICANT CHANGE UP (ref 3.5–5.3)
RBC # BLD: 3.93 M/UL — SIGNIFICANT CHANGE UP (ref 3.8–5.2)
RBC # FLD: 12 % — SIGNIFICANT CHANGE UP (ref 10.3–14.5)
RH IG SCN BLD-IMP: POSITIVE — SIGNIFICANT CHANGE UP
S AUREUS DNA NOSE QL NAA+PROBE: SIGNIFICANT CHANGE UP
SODIUM SERPL-SCNC: 136 MMOL/L — SIGNIFICANT CHANGE UP (ref 135–145)
T3 SERPL-MCNC: 90 NG/DL — SIGNIFICANT CHANGE UP (ref 80–200)
T4 AB SER-ACNC: 5.1 UG/DL — SIGNIFICANT CHANGE UP (ref 4.6–12)
TOTAL CHOLESTEROL/HDL RATIO MEASUREMENT: 4.1 RATIO — SIGNIFICANT CHANGE UP (ref 3.3–7.1)
TRIGL SERPL-MCNC: 153 MG/DL — HIGH (ref 10–149)
TSH SERPL-MCNC: 5.43 UIU/ML — HIGH (ref 0.27–4.2)
WBC # BLD: 6.3 K/UL — SIGNIFICANT CHANGE UP (ref 3.8–10.5)
WBC # FLD AUTO: 6.3 K/UL — SIGNIFICANT CHANGE UP (ref 3.8–10.5)

## 2019-04-10 PROCEDURE — 93880 EXTRACRANIAL BILAT STUDY: CPT | Mod: 26

## 2019-04-10 PROCEDURE — 94010 BREATHING CAPACITY TEST: CPT | Mod: 26

## 2019-04-10 PROCEDURE — 99232 SBSQ HOSP IP/OBS MODERATE 35: CPT

## 2019-04-10 PROCEDURE — 99233 SBSQ HOSP IP/OBS HIGH 50: CPT | Mod: GC

## 2019-04-10 RX ORDER — CHLORHEXIDINE GLUCONATE 213 G/1000ML
30 SOLUTION TOPICAL ONCE
Qty: 0 | Refills: 0 | Status: DISCONTINUED | OUTPATIENT
Start: 2019-04-10 | End: 2019-04-10

## 2019-04-10 RX ORDER — CEFUROXIME AXETIL 250 MG
1500 TABLET ORAL ONCE
Qty: 0 | Refills: 0 | Status: DISCONTINUED | OUTPATIENT
Start: 2019-04-10 | End: 2019-04-10

## 2019-04-10 RX ORDER — CHLORHEXIDINE GLUCONATE 213 G/1000ML
1 SOLUTION TOPICAL ONCE
Qty: 0 | Refills: 0 | Status: DISCONTINUED | OUTPATIENT
Start: 2019-04-10 | End: 2019-04-10

## 2019-04-10 RX ADMIN — Medication 81 MILLIGRAM(S): at 12:36

## 2019-04-10 RX ADMIN — Medication 1: at 18:06

## 2019-04-10 RX ADMIN — Medication 1: at 13:22

## 2019-04-10 RX ADMIN — Medication 12.5 MILLIGRAM(S): at 18:07

## 2019-04-10 RX ADMIN — HEPARIN SODIUM 0 UNIT(S)/HR: 5000 INJECTION INTRAVENOUS; SUBCUTANEOUS at 06:04

## 2019-04-10 RX ADMIN — HEPARIN SODIUM 550 UNIT(S)/HR: 5000 INJECTION INTRAVENOUS; SUBCUTANEOUS at 13:32

## 2019-04-10 RX ADMIN — ATORVASTATIN CALCIUM 80 MILLIGRAM(S): 80 TABLET, FILM COATED ORAL at 21:02

## 2019-04-10 RX ADMIN — Medication 12.5 MILLIGRAM(S): at 05:15

## 2019-04-10 RX ADMIN — HEPARIN SODIUM 550 UNIT(S)/HR: 5000 INJECTION INTRAVENOUS; SUBCUTANEOUS at 06:50

## 2019-04-10 RX ADMIN — HEPARIN SODIUM 550 UNIT(S)/HR: 5000 INJECTION INTRAVENOUS; SUBCUTANEOUS at 21:01

## 2019-04-10 NOTE — PROGRESS NOTE ADULT - SUBJECTIVE AND OBJECTIVE BOX
Overnight Events: S/p LHC showing multivessel disease. No CP, SOB.    Medications:  aspirin enteric coated 81 milliGRAM(s) Oral daily  atorvastatin 80 milliGRAM(s) Oral at bedtime  dextrose 40% Gel 15 Gram(s) Oral once PRN  dextrose 5%. 1000 milliLiter(s) IV Continuous <Continuous>  dextrose 50% Injectable 12.5 Gram(s) IV Push once  dextrose 50% Injectable 25 Gram(s) IV Push once  dextrose 50% Injectable 25 Gram(s) IV Push once  glucagon  Injectable 1 milliGRAM(s) IntraMuscular once PRN  heparin  Infusion. 700 Unit(s)/Hr IV Continuous <Continuous>  heparin  Injectable 4400 Unit(s) IV Push every 6 hours PRN  insulin lispro (HumaLOG) corrective regimen sliding scale   SubCutaneous three times a day before meals  insulin lispro (HumaLOG) corrective regimen sliding scale   SubCutaneous at bedtime  metoprolol tartrate 12.5 milliGRAM(s) Oral two times a day      REVIEW OF SYSTEMS:  CONSTITUTIONAL: No weakness, fevers or chills  EYES/ENT: No visual changes;  No vertigo or throat pain   NECK: No pain or stiffness  RESPIRATORY: No cough, wheezing, hemoptysis; No shortness of breath  CARDIOVASCULAR: No chest pain or palpitations  GASTROINTESTINAL: No abdominal pain. No nausea, vomiting, or hematemesis; No diarrhea or constipation. No melena or hematochezia.  GENITOURINARY: No dysuria, frequency or hematuria  NEUROLOGICAL: No numbness or weakness  SKIN: No itching or rash  All other review of systems is negative unless indicated above    Vitals:  T(F): 97.9 (04-10), Max: 98.1 (04-09)  HR: 63 (04-10) (63 - 81)  BP: 148/55 (04-10) (130/62 - 174/92)  RR: 18 (04-10)  SpO2: 100% (04-10)  I&O's Summary    09 Apr 2019 07:01  -  10 Apr 2019 06:55  --------------------------------------------------------  IN: 296 mL / OUT: 0 mL / NET: 296 mL      Physical Exam:  Appearance: No acute distress; well appearing  Eyes: PERRL, EOMI, pink conjunctiva  HENT: Normal oral muscosa  Cardiovascular: RRR, S1, S2, no murmurs, rubs, or gallops; no edema; no JVD  Respiratory: Clear to auscultation bilaterally  Gastrointestinal: soft, non-tender, non-distended with normal bowel sounds  Musculoskeletal: No clubbing; no joint deformity   Neurologic: Non-focal  Lymphatic: No lymphadenopathy  Psychiatry: AAOx3, mood & affect appropriate  Skin: No rashes, ecchymoses, or cyanosis                          11.8   6.3   )-----------( 252      ( 10 Apr 2019 05:42 )             35.4     04-09    139  |  101  |  12  ----------------------------<  146<H>  4.6   |  24  |  0.80    Ca    9.6      09 Apr 2019 01:20    TPro  6.9  /  Alb  3.8  /  TBili  0.2  /  DBili  x   /  AST  50<H>  /  ALT  26  /  AlkPhos  95  04-09    PT/INR - ( 09 Apr 2019 01:20 )   PT: 12.3 sec;   INR: 1.07 ratio         PTT - ( 10 Apr 2019 05:41 )  PTT:90.8 sec    Interpretation of Telemetry:  SR 60-60      < from: Transthoracic Echocardiogram (04.09.19 @ 16:29) >  1. Mitral annular calcification and calcified mitral  leaflets with normal diastolic opening. Moderate mitral  regurgitation.  2. Calcified trileafletaortic valve with normal opening.  No aortic valve regurgitation seen.  3. Normal left ventricular systolic function. No segmental  wall motion abnormalities.  4. Normal right ventricular size and function.    < end of copied text >

## 2019-04-10 NOTE — PROGRESS NOTE ADULT - ASSESSMENT
84 y/o Female with PMHx of T2DM, HLD, Vit D deficiency, who presented to [facility] with c/o acute onset chest pain. +EKG changes s/p aspirin load, plavix load, hep gtt, and transferred to [facility] for cards eval and cardiac cath. 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  Hospital Course:   Pre op cardiac surgery work up in progress. 84 y/o Female with PMHx of T2DM, HLD, Vit D deficiency, who presented to [facility] with c/o acute onset chest pain. +EKG changes s/p aspirin load, plavix load, hep gtt, and transferred to [facility] for cards eval and cardiac cath. 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  Hospital Course:   Pre op cardiac surgery work up in progress.   Cardiac surgery re-scheduled for Friday 4/12 with Dr. Barba

## 2019-04-10 NOTE — PROGRESS NOTE ADULT - ASSESSMENT
83 F PMH T2DM, HLD, Vit D deficiency, p/w one episode of acute onset chest pain today, f/w NSTEMI, pending [facility].

## 2019-04-10 NOTE — PROGRESS NOTE ADULT - ASSESSMENT
84 yo [place] female w h/o DM2, HLD, family h/o CAD with NSTEMI found to have MVD. TTE showing preserved LV function with moderate MR.    NSTEMI. S/p LHC showing MVD. CT surgery following.  - Hemodynamically stable at this time. No chest pain  - Cont ASA, statin  - Hold P2Y12 inhibitor  - heparin gtt for 48 horus  - tentative plan for CABG on Thurs  - Cont metoprolol tartrate 12.5 BID    Moderate MR.   - Consider addressing at time of CABG      Lonnie Gold MD  Cardiology Fellow [id number] 84 yo [place] female w h/o DM2, HLD, family h/o CAD with NSTEMI found to have MVD. TTE showing preserved LV function with moderate MR.    NSTEMI. S/p LHC showing MVD. CT surgery following.  - Hemodynamically stable at this time. No chest pain  - Cont ASA, statin  - Hold P2Y12 inhibitor  - heparin gtt for 48 horus  - tentative plan for CABG on Thurs  - Cont metoprolol tartrate 12.5 BID    Moderate MR.       Lonnie Gold MD  Cardiology Fellow [id number]

## 2019-04-10 NOTE — PROGRESS NOTE ADULT - PROBLEM SELECTOR PLAN 1
-no current chest pain,  s/p aspirin and Plavix load, still currently on heparin gtt.  Cardiac cath showed multivessel CAD, patient needs CABG, schedule pending.    -start Lipitor 80, metoprolol 12.5 bid  - CABG workup ongoing.

## 2019-04-10 NOTE — PROGRESS NOTE ADULT - PROBLEM SELECTOR PLAN 1
Pre op Cardiac surgery work up in progress   Continue with ASA 81 mg PO Daily   Continue with Lopressor 12.5 mg PO BID   Continue with Lipitor 80 mg PO HS   NPO after midnight   Hibiclens Shower tonight at 8 pm   Heparin gtt on call to OR for ACS   Plan for Cardiac Surgery in AM with Dr. Barba Pre op Cardiac surgery work up in progress   Continue with ASA 81 mg PO Daily   Continue with Lopressor 12.5 mg PO BID   Continue with Lipitor 80 mg PO HS   Heparin gtt on call to OR for ACS   Plan for Cardiac Surgery in AM with Dr. Barba on Friday 4/12

## 2019-04-10 NOTE — PROGRESS NOTE ADULT - SUBJECTIVE AND OBJECTIVE BOX
Cardiac Surgery Pre-op Note:    CC: Patient is a 83y old  Female who presents with a chief complaint of NSTEMI (10 Apr 2019 12:45)      Referring Physician: Dr. Wang                                                                                                            Surgeon: Dr. Barba     Procedure: () (CABG)    Allergies    No Known Allergies    Intolerances    HPI:  83 F PMH T2DM, HLD, Vit D deficiency, p/w one episode of acute onset chest pain today. Mid sternal, non radiating, sharp, precipitated by exertion (pt was carrying heavy grocery bags) and relieved by rest. Never had similar sx before. Denies associated sob, palpitations, LE edema, orthopnea.  NO prior provocative testing, no prior aspirin use. Unk baseline ekg. +FH early CAD with MI (brother, mother both with MI in 50s). Lifelong nonsmoker, no etoh or other toxic habits. Pt presented to [facility] where EKG showed signs of inferolateral ischemia, and troponin was elevated. s/p aspirin load, plavix load, hep gtt, and transferred to [facility] for cards eval and cardiac cath. Currently chest pain free, resting comfortably.     VS: 98.0, 70, 129/74, 18, 97% ra. Labs: anemia no baseline, rest cbc unrevealing, PTT initially supratherapeutic, repeat within range,  --> 548, cmp with hyperglycemia otherwise unrevealing.  CXR prelim clear lungs. (2019 05:31)      PAST MEDICAL & SURGICAL HISTORY:  Hyperlipidemia  Diabetes mellitus  H/O abdominal hysterectomy    MEDICATIONS  (STANDING):  aspirin enteric coated 81 milliGRAM(s) Oral daily  atorvastatin 80 milliGRAM(s) Oral at bedtime  cefuroxime  IVPB 1500 milliGRAM(s) IV Intermittent once  chlorhexidine 0.12% Liquid 30 milliLiter(s) Swish and Spit once  chlorhexidine 4% Liquid 1 Application(s) Topical once  dextrose 5%. 1000 milliLiter(s) (50 mL/Hr) IV Continuous <Continuous>  dextrose 50% Injectable 12.5 Gram(s) IV Push once  dextrose 50% Injectable 25 Gram(s) IV Push once  dextrose 50% Injectable 25 Gram(s) IV Push once  heparin  Infusion. 700 Unit(s)/Hr (7 mL/Hr) IV Continuous <Continuous>  insulin lispro (HumaLOG) corrective regimen sliding scale   SubCutaneous three times a day before meals  insulin lispro (HumaLOG) corrective regimen sliding scale   SubCutaneous at bedtime  metoprolol tartrate 12.5 milliGRAM(s) Oral two times a day    MEDICATIONS  (PRN):  dextrose 40% Gel 15 Gram(s) Oral once PRN Blood Glucose LESS THAN 70 milliGRAM(s)/deciliter  glucagon  Injectable 1 milliGRAM(s) IntraMuscular once PRN Glucose LESS THAN 70 milligrams/deciliter  heparin  Injectable 4400 Unit(s) IV Push every 6 hours PRN For aPTT less than 40      Vital Signs Last 24 Hrs  T(C): 36.7 (04-10-19 @ 14:38), Max: 36.8 (04-10-19 @ 14:01)  T(F): 98.1 (04-10-19 @ 14:38), Max: 98.3 (04-10-19 @ 14:01)  HR: 88 (04-10-19 @ 14:38) (63 - 88)  BP: 112/68 (04-10-19 @ 14:38) (112/68 - 174/92)  RR: 20 (04-10-19 @ 14:38) (18 - 20)  SpO2: 99% (04-10-19 @ 14:38) (98% - 100%)          ABO Interpretation: B (04-10 @ 06:06)     Daily Height in cm: 157.48 (10 Apr 2019 05:48)    Daily Weight in k.7 (10 Apr 2019 04:46)  Admit Wt: Drug Dosing Weight  Height (cm): 157.48 (10 Apr 2019 05:48)  Weight (kg): 74.2 (2019 01:01)  BMI (kg/m2): 29.9 (10 Apr 2019 05:48)  BSA (m2): 1.75 (10 Apr 2019 05:48)    Labs:                        11.8   6.3   )-----------( 252      ( 10 Apr 2019 05:42 )             35.4     04-10    136  |  99  |  18  ----------------------------<  172<H>  4.6   |  24  |  0.90    Ca    9.7      10 Apr 2019 14:50    TPro  6.9  /  Alb  3.8  /  TBili  0.2  /  DBili  x   /  AST  50<H>  /  ALT  26  /  AlkPhos  95      PT/INR - ( 2019 01:20 )   PT: 12.3 sec;   INR: 1.07 ratio      PTT - ( 10 Apr 2019 13:00 )  PTT:66.3 sec  P2Y12: P2Y12 Plt Reactivity: 260 PRU (04-10-19 @ 14:50)  P2Y12 Plt Reactivity: 163 PRU (19 @ 22:41)  P2Y12 Plt Reactivity: 180 PRU (19 @ 16:27)    Blood Type: ABO Interpretation: B (04-10 @ 06:06)    HGB A1C: Hemoglobin A1C, Whole Blood: 8.1 % (04-10 @ 09:48)    Prealbumin:   Pro-BNP: Serum Pro-Brain Natriuretic Peptide: 1759 pg/mL (04-10 @ 07:42)    Thyroid Panel: 04-10 @ 09:15/5.43  --/.    MRSA: MRSA PCR Result.: NotDetec ( 22:43)   / MSSA:     CXR: < from: Xray Chest 1 View AP/PA (19 @ 01:51) FINDINGS: Cardiac silhouette is within normal limits. Lungs are clear. No pleural effusions or pneumothorax. No acute osseous abnormality. IMPRESSION: Clear lungs.    EKG: < from: 12 Lead ECG (19 @ 23:56) >    Ventricular Rate 67 BPM    Atrial Rate 67 BPM    P-R Interval 214 ms    QRS Duration 82 ms    Q-T Interval 426 ms    QTC Calculation(Bezet) 450 ms    P Axis 53 degrees    R Axis -5 degrees    T Axis 102 degrees    Diagnosis Line SINUS RHYTHM WITH 1ST DEGREE A-V BLOCK  ST & T WAVE ABNORMALITY, CONSIDER INFEROLATERAL ISCHEMIA  ABNORMAL ECG    Carotid Duplex:  < from: VA Duplex Carotid, Bilat (04.10.19 @ 12:14) INTERPRETATION:  No hemodynamically significant stenosis.    PFT's:    Echocardiogram: < from: Transthoracic Echocardiogram (19 @ 16:29) 1. Mitral annular calcification and calcified mitral leaflets with normal diastolic opening. Moderate mitral  regurgitation. 2. Calcified trileaflet aortic valve with normal opening. No aortic valve regurgitation seen. Normal left ventricular systolic function. No segmental wall motion abnormalities.  Normal right ventricular size and function.    Cardiac catheterization: < from: Cardiac Cath Lab - Adult (19 @ 11:26) The coronary circulation is right dominant.  LM:   --  LM: Angiography showed minor luminal irregularities with no flow  limiting lesions.  LAD:   --  Proximal LAD: There was a 99 % stenosis.  CX:   --Mid circumflex: There was a 90 % stenosis.  RCA:   --  RCA: Angiography showed minor luminal irregularities with no  flow limiting lesions.  COMPLICATIONS: There were no complications.    Gen: WN/WD NAD  Neuro: AAOx3, nonfocal  Pulm: CTA B/L  CV: RRR, S1S2  Abd: Soft, NT, ND +BS  Ext: No edema, + peripheral pulses      Pt has AICD/PPM [ ] Yes  [X ] No             Brand Name:  Pre-op Beta Blocker ordered within 24 hrs of surgery (CABG ONLY)?  [X] Yes  [ ] No  If not, Why?  Type & Cross  [X ] Yes  [ ] No  NPO after Midnight [X ] Yes  [ ] No  Pre-op ABX ordered, to be taped on chart:  [X ] Yes  [ ] No     Hibiclens/Peridex ordered [X ] Yes  [ ] No  Intraop on Hold:  JORGE [X]   Consent obtained  [ X] Yes  [ ] No Cardiac Surgery Pre-op Note:    CC: Patient is a 83y old  Female who presents with a chief complaint of NSTEMI (10 Apr 2019 12:45)      Referring Physician: Dr. Wang / Dr. Tripp                                                                                                            Surgeon: Dr. Babra     Procedure: () (CABG)    Allergies    No Known Allergies    Intolerances    HPI:  83 F PMH T2DM, HLD, Vit D deficiency, p/w one episode of acute onset chest pain today. Mid sternal, non radiating, sharp, precipitated by exertion (pt was carrying heavy grocery bags) and relieved by rest. Never had similar sx before. Denies associated sob, palpitations, LE edema, orthopnea.  NO prior provocative testing, no prior aspirin use. Unk baseline ekg. +FH early CAD with MI (brother, mother both with MI in 50s). Lifelong nonsmoker, no etoh or other toxic habits. Pt presented to [facility] where EKG showed signs of inferolateral ischemia, and troponin was elevated. s/p aspirin load, plavix load, hep gtt, and transferred to [facility] for cards eval and cardiac cath. Currently chest pain free, resting comfortably.     VS: 98.0, 70, 129/74, 18, 97% ra. Labs: anemia no baseline, rest cbc unrevealing, PTT initially supratherapeutic, repeat within range,  --> 548, cmp with hyperglycemia otherwise unrevealing.  CXR prelim clear lungs. (2019 05:31)      PAST MEDICAL & SURGICAL HISTORY:  Hyperlipidemia  Diabetes mellitus  H/O abdominal hysterectomy    MEDICATIONS  (STANDING):  aspirin enteric coated 81 milliGRAM(s) Oral daily  atorvastatin 80 milliGRAM(s) Oral at bedtime  cefuroxime  IVPB 1500 milliGRAM(s) IV Intermittent once  chlorhexidine 0.12% Liquid 30 milliLiter(s) Swish and Spit once  chlorhexidine 4% Liquid 1 Application(s) Topical once  dextrose 5%. 1000 milliLiter(s) (50 mL/Hr) IV Continuous <Continuous>  dextrose 50% Injectable 12.5 Gram(s) IV Push once  dextrose 50% Injectable 25 Gram(s) IV Push once  dextrose 50% Injectable 25 Gram(s) IV Push once  heparin  Infusion. 700 Unit(s)/Hr (7 mL/Hr) IV Continuous <Continuous>  insulin lispro (HumaLOG) corrective regimen sliding scale   SubCutaneous three times a day before meals  insulin lispro (HumaLOG) corrective regimen sliding scale   SubCutaneous at bedtime  metoprolol tartrate 12.5 milliGRAM(s) Oral two times a day    MEDICATIONS  (PRN):  dextrose 40% Gel 15 Gram(s) Oral once PRN Blood Glucose LESS THAN 70 milliGRAM(s)/deciliter  glucagon  Injectable 1 milliGRAM(s) IntraMuscular once PRN Glucose LESS THAN 70 milligrams/deciliter  heparin  Injectable 4400 Unit(s) IV Push every 6 hours PRN For aPTT less than 40      Vital Signs Last 24 Hrs  T(C): 36.7 (04-10-19 @ 14:38), Max: 36.8 (04-10-19 @ 14:01)  T(F): 98.1 (04-10-19 @ 14:38), Max: 98.3 (04-10-19 @ 14:01)  HR: 88 (04-10-19 @ 14:38) (63 - 88)  BP: 112/68 (04-10-19 @ 14:38) (112/68 - 174/92)  RR: 20 (04-10-19 @ 14:38) (18 - 20)  SpO2: 99% (04-10-19 @ 14:38) (98% - 100%)          ABO Interpretation: B (04-10 @ 06:06)     Daily Height in cm: 157.48 (10 Apr 2019 05:48)    Daily Weight in k.7 (10 Apr 2019 04:46)  Admit Wt: Drug Dosing Weight  Height (cm): 157.48 (10 Apr 2019 05:48)  Weight (kg): 74.2 (2019 01:01)  BMI (kg/m2): 29.9 (10 Apr 2019 05:48)  BSA (m2): 1.75 (10 Apr 2019 05:48)    Labs:                        11.8   6.3   )-----------( 252      ( 10 Apr 2019 05:42 )             35.4     04-10    136  |  99  |  18  ----------------------------<  172<H>  4.6   |  24  |  0.90    Ca    9.7      10 Apr 2019 14:50    TPro  6.9  /  Alb  3.8  /  TBili  0.2  /  DBili  x   /  AST  50<H>  /  ALT  26  /  AlkPhos  95      PT/INR - ( 2019 01:20 )   PT: 12.3 sec;   INR: 1.07 ratio      PTT - ( 10 Apr 2019 13:00 )  PTT:66.3 sec  P2Y12: P2Y12 Plt Reactivity: 260 PRU (04-10-19 @ 14:50)  P2Y12 Plt Reactivity: 163 PRU (19 @ 22:41)  P2Y12 Plt Reactivity: 180 PRU (19 @ 16:27)    Blood Type: ABO Interpretation: B (04-10 @ 06:06)    HGB A1C: Hemoglobin A1C, Whole Blood: 8.1 % (04-10 @ 09:48)    Prealbumin:   Pro-BNP: Serum Pro-Brain Natriuretic Peptide: 1759 pg/mL (04-10 @ 07:42)    Thyroid Panel: 04-10 @ 09:15/5.43  --/    MRSA: MRSA PCR Result.: NotDetec ( @ 22:43)   / MSSA:     CXR: < from: Xray Chest 1 View AP/PA (19 @ 01:51) FINDINGS: Cardiac silhouette is within normal limits. Lungs are clear. No pleural effusions or pneumothorax. No acute osseous abnormality. IMPRESSION: Clear lungs.    EKG: < from: 12 Lead ECG (19 @ 23:56) >    Ventricular Rate 67 BPM    Atrial Rate 67 BPM    P-R Interval 214 ms    QRS Duration 82 ms    Q-T Interval 426 ms    QTC Calculation(Bezet) 450 ms    P Axis 53 degrees    R Axis -5 degrees    T Axis 102 degrees    Diagnosis Line SINUS RHYTHM WITH 1ST DEGREE A-V BLOCK  ST & T WAVE ABNORMALITY, CONSIDER INFEROLATERAL ISCHEMIA  ABNORMAL ECG    Carotid Duplex:  < from: VA Duplex Carotid, Bilat (04.10.19 @ 12:14) INTERPRETATION:  No hemodynamically significant stenosis.    PFT's:    Echocardiogram: < from: Transthoracic Echocardiogram (19 @ 16:29) 1. Mitral annular calcification and calcified mitral leaflets with normal diastolic opening. Moderate mitral  regurgitation. 2. Calcified trileaflet aortic valve with normal opening. No aortic valve regurgitation seen. Normal left ventricular systolic function. No segmental wall motion abnormalities.  Normal right ventricular size and function.    Cardiac catheterization: < from: Cardiac Cath Lab - Adult (19 @ 11:26) The coronary circulation is right dominant.  LM:   --  LM: Angiography showed minor luminal irregularities with no flow  limiting lesions.  LAD:   --  Proximal LAD: There was a 99 % stenosis.  CX:   --Mid circumflex: There was a 90 % stenosis.  RCA:   --  RCA: Angiography showed minor luminal irregularities with no  flow limiting lesions.  COMPLICATIONS: There were no complications.    Gen: WN/WD NAD  Neuro: AAOx3, nonfocal  Pulm: CTA B/L  CV: RRR, S1S2  Abd: Soft, NT, ND +BS  Ext: No edema, + peripheral pulses      Pt has AICD/PPM [ ] Yes  [X ] No             Brand Name:  Pre-op Beta Blocker ordered within 24 hrs of surgery (CABG ONLY)?  [X] Yes  [ ] No  If not, Why?  Type & Cross  [X ] Yes  [ ] No  NPO after Midnight [X ] Yes  [ ] No  Pre-op ABX ordered, to be taped on chart:  [X ] Yes  [ ] No     Hibiclens/Peridex ordered [X ] Yes  [ ] No  Intraop on Hold:  JORGE [X]   Consent obtained  [ X] Yes  [ ] No

## 2019-04-10 NOTE — PROGRESS NOTE ADULT - SUBJECTIVE AND OBJECTIVE BOX
Patient is a 83y old  Female who presents with a chief complaint of NSTEMI (10 Apr 2019 06:55)      INTERVAL HPI/OVERNIGHT EVENTS:  No acute overnight events.        Other chest pain  FH: myocardial infarction  Handoff  MEWS Score  Hyperlipidemia  Diabetes mellitus  Other chest pain  CAD (coronary artery disease)  Prophylactic measure  Type 2 diabetes mellitus without complication, without long-term current use of insulin  NSTEMI (non-ST elevated myocardial infarction)  H/O abdominal hysterectomy  ST DEPRESSIONS      Review of Systems: 12 point review of systems otherwise negative  ( - )fevers/chills  ( - ) dyspnea  ( - ) cough  ( - ) chest pain  ( - ) palpitations  ( - ) dizziness/lightheadedness  ( - ) nausea/vomiting  ( - ) abd pain  ( - ) diarrhea  ( - ) melena  ( - ) hematochezia  ( - ) dysuria  ( - ) hematuria  ( - ) leg swelling  ( -) calf tenderness  ( - ) motor weakness  ( - ) extremity numbness  ( - ) back pain  ( + ) tolerating POs  ( + ) BM    MEDICATIONS  (STANDING):  aspirin enteric coated 81 milliGRAM(s) Oral daily  atorvastatin 80 milliGRAM(s) Oral at bedtime  dextrose 5%. 1000 milliLiter(s) (50 mL/Hr) IV Continuous <Continuous>  dextrose 50% Injectable 12.5 Gram(s) IV Push once  dextrose 50% Injectable 25 Gram(s) IV Push once  dextrose 50% Injectable 25 Gram(s) IV Push once  heparin  Infusion. 700 Unit(s)/Hr (7 mL/Hr) IV Continuous <Continuous>  insulin lispro (HumaLOG) corrective regimen sliding scale   SubCutaneous three times a day before meals  insulin lispro (HumaLOG) corrective regimen sliding scale   SubCutaneous at bedtime  metoprolol tartrate 12.5 milliGRAM(s) Oral two times a day    MEDICATIONS  (PRN):  dextrose 40% Gel 15 Gram(s) Oral once PRN Blood Glucose LESS THAN 70 milliGRAM(s)/deciliter  glucagon  Injectable 1 milliGRAM(s) IntraMuscular once PRN Glucose LESS THAN 70 milligrams/deciliter  heparin  Injectable 4400 Unit(s) IV Push every 6 hours PRN For aPTT less than 40      Allergies    No Known Allergies    Intolerances          Vital Signs Last 24 Hrs  T(C): 36.6 (10 Apr 2019 04:46), Max: 36.7 (2019 21:08)  T(F): 97.9 (10 Apr 2019 04:46), Max: 98.1 (2019 21:08)  HR: 63 (10 Apr 2019 04:46) (63 - 81)  BP: 148/55 (10 Apr 2019 04:46) (130/62 - 174/92)  BP(mean): --  RR: 18 (10 Apr 2019 04:46) (18 - 18)  SpO2: 100% (10 Apr 2019 04:46) (98% - 100%)  CAPILLARY BLOOD GLUCOSE      POCT Blood Glucose.: 113 mg/dL (10 Apr 2019 08:53)  POCT Blood Glucose.: 207 mg/dL (2019 21:28)  POCT Blood Glucose.: 130 mg/dL (2019 17:58)       @ :01  -  04-10 @ 07:00  --------------------------------------------------------  IN: 296 mL / OUT: 0 mL / NET: 296 mL    04-10 @ :01  -  04-10 @ 12:45  --------------------------------------------------------  IN: 540 mL / OUT: 0 mL / NET: 540 mL        Physical Exam:    Daily Height in cm: 157.48 (10 Apr 2019 05:48)    Daily Weight in k.7 (10 Apr 2019 04:46)  General:  NAD  HEENT:  Nonicteric, PERRLA  CV:  RRR, no murmur  Lungs:  CTA B/L, no wheeze  Abdomen:  Soft, non-tender, no distended  Extremities:  no edema  Skin:  Warm and dry, no rashes  :  No epstein  Neuro:  AAOx3, non-focal  No Restraints    LABS:                        11.8   6.3   )-----------( 252      ( 10 Apr 2019 05:42 )             35.4         139  |  101  |  12  ----------------------------<  146<H>  4.6   |  24  |  0.80    Ca    9.6      2019 01:20    TPro  6.9  /  Alb  3.8  /  TBili  0.2  /  DBili  x   /  AST  50<H>  /  ALT  26  /  AlkPhos  95  04-09    PT/INR - ( 2019 01:20 )   PT: 12.3 sec;   INR: 1.07 ratio         PTT - ( 10 Apr 2019 05:41 )  PTT:90.8 sec        RADIOLOGY & ADDITIONAL TESTS:    ---------------------------------------------------------------------------  I personally reviewed: [  ]EKG   [  ]CXR    [  ] CT    [  ]Other  ---------------------------------------------------------------------------  PLEASE CHECK WHEN PRESENT:     [  ]Heart Failure     [  ] Acute     [  ] Acute on Chronic     [  ] Chronic  -------------------------------------------------------------------     [  ]Diastolic [HFpEF]     [  ]Systolic [HFrEF]     [  ]Combined [HFpEF & HFrEF]     [  ]Other:  -------------------------------------------------------------------  [  ]ZARIA     [  ]ATN     [  ]Reneal Medullary Necrosis     [  ]Renal Cortical Necrosis     [  ]Other Pathological Lesions:    [  ]CKD 1  [  ]CKD 2  [  ]CKD 3  [  ]CKD 4  [  ]CKD 5  [  ]Other  -------------------------------------------------------------------  [  ]Other/Unspecified:    --------------------------------------------------------------------    Abdominal Nutritional Status  [  ]Malnutrition: See Nutrition Note  [  ]Cachexia  [  ]Other:   [  ]Supplement Ordered:  [  ]Morbid Obesity (BMI >=40]

## 2019-04-10 NOTE — INPATIENT CERTIFICATION FOR MEDICARE PATIENTS - RISKS OF ADVERSE EVENTS
Other:/Cardiac Surgery/Concern for cardiopulmonary deterioration/Concern for delay in diagnosis and treatment

## 2019-04-11 ENCOUNTER — TRANSCRIPTION ENCOUNTER (OUTPATIENT)
Age: 84
End: 2019-04-11

## 2019-04-11 LAB
APTT BLD: 61.1 SEC — HIGH (ref 27.5–36.3)
HCT VFR BLD CALC: 33.7 % — LOW (ref 34.5–45)
HGB BLD-MCNC: 11.4 G/DL — LOW (ref 11.5–15.5)
MCHC RBC-ENTMCNC: 30.5 PG — SIGNIFICANT CHANGE UP (ref 27–34)
MCHC RBC-ENTMCNC: 33.9 GM/DL — SIGNIFICANT CHANGE UP (ref 32–36)
MCV RBC AUTO: 90 FL — SIGNIFICANT CHANGE UP (ref 80–100)
PLATELET # BLD AUTO: 228 K/UL — SIGNIFICANT CHANGE UP (ref 150–400)
RBC # BLD: 3.74 M/UL — LOW (ref 3.8–5.2)
RBC # FLD: 12.1 % — SIGNIFICANT CHANGE UP (ref 10.3–14.5)
WBC # BLD: 5.5 K/UL — SIGNIFICANT CHANGE UP (ref 3.8–10.5)
WBC # FLD AUTO: 5.5 K/UL — SIGNIFICANT CHANGE UP (ref 3.8–10.5)

## 2019-04-11 PROCEDURE — 99232 SBSQ HOSP IP/OBS MODERATE 35: CPT

## 2019-04-11 PROCEDURE — 99232 SBSQ HOSP IP/OBS MODERATE 35: CPT | Mod: 24

## 2019-04-11 PROCEDURE — 99233 SBSQ HOSP IP/OBS HIGH 50: CPT | Mod: GC

## 2019-04-11 RX ORDER — CEFUROXIME AXETIL 250 MG
1500 TABLET ORAL ONCE
Qty: 0 | Refills: 0 | Status: DISCONTINUED | OUTPATIENT
Start: 2019-04-11 | End: 2019-04-12

## 2019-04-11 RX ORDER — CHLORHEXIDINE GLUCONATE 213 G/1000ML
1 SOLUTION TOPICAL ONCE
Qty: 0 | Refills: 0 | Status: COMPLETED | OUTPATIENT
Start: 2019-04-12 | End: 2019-04-12

## 2019-04-11 RX ORDER — CHLORHEXIDINE GLUCONATE 213 G/1000ML
30 SOLUTION TOPICAL ONCE
Qty: 0 | Refills: 0 | Status: COMPLETED | OUTPATIENT
Start: 2019-04-11 | End: 2019-04-12

## 2019-04-11 RX ORDER — CHLORHEXIDINE GLUCONATE 213 G/1000ML
1 SOLUTION TOPICAL ONCE
Qty: 0 | Refills: 0 | Status: COMPLETED | OUTPATIENT
Start: 2019-04-11 | End: 2019-04-11

## 2019-04-11 RX ADMIN — ATORVASTATIN CALCIUM 80 MILLIGRAM(S): 80 TABLET, FILM COATED ORAL at 21:32

## 2019-04-11 RX ADMIN — CHLORHEXIDINE GLUCONATE 1 APPLICATION(S): 213 SOLUTION TOPICAL at 21:37

## 2019-04-11 RX ADMIN — Medication 1: at 18:21

## 2019-04-11 RX ADMIN — Medication 12.5 MILLIGRAM(S): at 17:31

## 2019-04-11 RX ADMIN — HEPARIN SODIUM 550 UNIT(S)/HR: 5000 INJECTION INTRAVENOUS; SUBCUTANEOUS at 07:42

## 2019-04-11 RX ADMIN — Medication 81 MILLIGRAM(S): at 13:02

## 2019-04-11 RX ADMIN — Medication 12.5 MILLIGRAM(S): at 05:34

## 2019-04-11 NOTE — PROGRESS NOTE ADULT - SUBJECTIVE AND OBJECTIVE BOX
Overnight Events: MINNA. No CP, SOB.    Medications:  aspirin enteric coated 81 milliGRAM(s) Oral daily  atorvastatin 80 milliGRAM(s) Oral at bedtime  dextrose 40% Gel 15 Gram(s) Oral once PRN  dextrose 5%. 1000 milliLiter(s) IV Continuous <Continuous>  dextrose 50% Injectable 12.5 Gram(s) IV Push once  dextrose 50% Injectable 25 Gram(s) IV Push once  dextrose 50% Injectable 25 Gram(s) IV Push once  glucagon  Injectable 1 milliGRAM(s) IntraMuscular once PRN  heparin  Infusion. 700 Unit(s)/Hr IV Continuous <Continuous>  heparin  Injectable 4400 Unit(s) IV Push every 6 hours PRN  insulin lispro (HumaLOG) corrective regimen sliding scale   SubCutaneous three times a day before meals  insulin lispro (HumaLOG) corrective regimen sliding scale   SubCutaneous at bedtime  metoprolol tartrate 12.5 milliGRAM(s) Oral two times a day      REVIEW OF SYSTEMS:  CONSTITUTIONAL: No weakness, fevers or chills  EYES/ENT: No visual changes;  No vertigo or throat pain   NECK: No pain or stiffness  RESPIRATORY: No cough, wheezing, hemoptysis; No shortness of breath  CARDIOVASCULAR: No chest pain or palpitations  GASTROINTESTINAL: No abdominal pain. No nausea, vomiting, or hematemesis; No diarrhea or constipation. No melena or hematochezia.  GENITOURINARY: No dysuria, frequency or hematuria  NEUROLOGICAL: No numbness or weakness  SKIN: No itching or rash  All other review of systems is negative unless indicated above    Vitals:  T(F): 97.9 (-), Max: 98.3 (-10)  HR: 63 () (63 - 88)  BP: 125/64 () (112/68 - 144/75)  RR: 18 (-)  SpO2: 100% ()  I&O's Summary    10 Apr 2019 07:01  -  2019 07:00  --------------------------------------------------------  IN: 886 mL / OUT: 0 mL / NET: 886 mL      Physical Exam:  Appearance: No acute distress; well appearing  Eyes: PERRL, EOMI, pink conjunctiva  HENT: Normal oral muscosa  Cardiovascular: RRR, S1, S2, no murmurs, rubs, or gallops; no edema; no JVD  Respiratory: Clear to auscultation bilaterally  Gastrointestinal: soft, non-tender, non-distended with normal bowel sounds  Musculoskeletal: No clubbing; no joint deformity   Neurologic: Non-focal  Lymphatic: No lymphadenopathy  Psychiatry: AAOx3, mood & affect appropriate  Skin: No rashes, ecchymoses, or cyanosis                          11.8   6.3   )-----------( 252      ( 10 Apr 2019 05:42 )             35.4     04-10    136  |  99  |  18  ----------------------------<  172<H>  4.6   |  24  |  0.90    Ca    9.7      10 Apr 2019 14:50      PTT - ( 10 Apr 2019 19:54 )  PTT:64.7 sec      Serum Pro-Brain Natriuretic Peptide: 1759 pg/mL (04-10 @ 07:42)    Total Cholesterol: 188  LDL: 111  HDL: 46  T    Hemoglobin A1C, Whole Blood: 8.1 % (04-10 @ 09:48)    Interpretation of Telemetry:  SR 60-70

## 2019-04-11 NOTE — PROGRESS NOTE ADULT - ASSESSMENT
83 F PMH T2DM, HLD, Vit D deficiency, p/w one episode of acute onset chest pain today, f/w NSTEMI, pending [facility].

## 2019-04-11 NOTE — PROGRESS NOTE ADULT - PROBLEM SELECTOR PLAN 1
-no current chest pain,  s/p aspirin and Plavix load, still currently on heparin gtt.  Cardiac cath showed multivessel CAD, patient needs CABG, on schedule for tomorrow.    -start Lipitor 80, metoprolol 12.5 bid  - CABG workup completed

## 2019-04-11 NOTE — PROGRESS NOTE ADULT - ASSESSMENT
82 yo [place] female w h/o DM2, HLD, family h/o CAD with NSTEMI found to have MVD. TTE showing preserved LV function with moderate MR.    NSTEMI. S/p LHC showing MVD. CT surgery following.  - Hemodynamically stable at this time. No chest pain.  - Cont ASA, statin  - Hold P2Y12 inhibitor  - heparin gtt until OR per CT surgery  - tentative plan for CABG on Friday  - Cont metoprolol tartrate    Moderate .       Lonnie Gold MD  Cardiology Fellow [id number]

## 2019-04-11 NOTE — PROGRESS NOTE ADULT - ATTENDING COMMENTS
83 year old woman with unstable angina, elevated troponin, pain resolved, on Heparin infusion. Coronary angiography demonstrates severe 3 vessel disease and coronary revascularization surgery planned tomorrow.
83 year old woman with unstable angina, elevated troponin, pain resolved, on Heparin infusion. Coronary angiography demonstrates severe 3 vessel disease and coronary revascularization surgery planned tomorrow.
Awaiting CABG for tomorrow

## 2019-04-11 NOTE — PROGRESS NOTE ADULT - SUBJECTIVE AND OBJECTIVE BOX
Patient is a 83y old  Female who presents with a chief complaint of NSTEMI (2019 07:23)      INTERVAL HPI/OVERNIGHT EVENTS:      Other chest pain  FH: myocardial infarction  Handoff  MEWS Score  Hyperlipidemia  Diabetes mellitus  Other chest pain  CAD (coronary artery disease)  Prophylactic measure  Type 2 diabetes mellitus without complication, without long-term current use of insulin  NSTEMI (non-ST elevated myocardial infarction)  H/O abdominal hysterectomy  ST DEPRESSIONS      Review of Systems: 12 point review of systems otherwise negative  ( - )fevers/chills  ( - ) dyspnea  ( - ) cough  ( - ) chest pain  ( - ) palpatations  ( - ) dizziness/lightheadedness  ( - ) nausea/vomiting  ( - ) abd pain  ( - ) diarrhea  ( - ) melena  ( - ) hematochezia  ( - ) dysuria  ( - ) hematuria  ( - ) leg swelling  ( -) calf tenderness  ( - ) motor weakness  ( - ) extremity numbness  ( - ) back pain  ( + ) tolerating POs  ( + ) BM    MEDICATIONS  (STANDING):  aspirin enteric coated 81 milliGRAM(s) Oral daily  atorvastatin 80 milliGRAM(s) Oral at bedtime  cefuroxime  IVPB 1500 milliGRAM(s) IV Intermittent once  chlorhexidine 0.12% Liquid 30 milliLiter(s) Swish and Spit once  chlorhexidine 4% Liquid 1 Application(s) Topical once  dextrose 5%. 1000 milliLiter(s) (50 mL/Hr) IV Continuous <Continuous>  dextrose 50% Injectable 12.5 Gram(s) IV Push once  dextrose 50% Injectable 25 Gram(s) IV Push once  dextrose 50% Injectable 25 Gram(s) IV Push once  heparin  Infusion. 700 Unit(s)/Hr (7 mL/Hr) IV Continuous <Continuous>  insulin lispro (HumaLOG) corrective regimen sliding scale   SubCutaneous three times a day before meals  insulin lispro (HumaLOG) corrective regimen sliding scale   SubCutaneous at bedtime  metoprolol tartrate 12.5 milliGRAM(s) Oral two times a day    MEDICATIONS  (PRN):  dextrose 40% Gel 15 Gram(s) Oral once PRN Blood Glucose LESS THAN 70 milliGRAM(s)/deciliter  glucagon  Injectable 1 milliGRAM(s) IntraMuscular once PRN Glucose LESS THAN 70 milligrams/deciliter  heparin  Injectable 4400 Unit(s) IV Push every 6 hours PRN For aPTT less than 40      Allergies    No Known Allergies    Intolerances          Vital Signs Last 24 Hrs  T(C): 36.3 (2019 13:05), Max: 36.7 (10 Apr 2019 14:38)  T(F): 97.4 (2019 13:05), Max: 98.1 (10 Apr 2019 14:38)  HR: 60 (2019 13:05) (60 - 88)  BP: 133/53 (2019 13:05) (112/68 - 138/71)  BP(mean): --  RR: 18 (2019 13:05) (18 - 20)  SpO2: 97% (2019 13:05) (97% - 100%)  CAPILLARY BLOOD GLUCOSE      POCT Blood Glucose.: 147 mg/dL (2019 13:07)  POCT Blood Glucose.: 142 mg/dL (2019 09:10)  POCT Blood Glucose.: 236 mg/dL (10 Apr 2019 21:22)  POCT Blood Glucose.: 155 mg/dL (10 Apr 2019 17:52)      04-10 @ 07:01  -  04-11 @ 07:00  --------------------------------------------------------  IN: 952 mL / OUT: 0 mL / NET: 952 mL        Physical Exam:    Daily     Daily Weight in k (2019 04:54)  General:  NAD  HEENT:  Nonicteric, PERRLA  CV:  RRR, no murmur  Lungs:  CTA B/L, no wheezes, rales, rhonchi  Abdomen:  Soft, non-tender, no distended, positive BS,  Extremities:  no edema  Skin:  Warm and dry, no rashes  :  No epstein  Neuro:  AAOx3, non-focal  No Restraints    LABS:                        11.4   5.5   )-----------( 228      ( 2019 06:49 )             33.7     04-10    136  |  99  |  18  ----------------------------<  172<H>  4.6   |  24  |  0.90    Ca    9.7      10 Apr 2019 14:50      PTT - ( 2019 06:49 )  PTT:61.1 sec        RADIOLOGY & ADDITIONAL TESTS:    ---------------------------------------------------------------------------  I personally reviewed: [  ]EKG   [  ]CXR    [  ] CT    [  ]Other  ---------------------------------------------------------------------------  PLEASE CHECK WHEN PRESENT:     [  ]Heart Failure     [  ] Acute     [  ] Acute on Chronic     [  ] Chronic  -------------------------------------------------------------------     [  ]Diastolic [HFpEF]     [  ]Systolic [HFrEF]     [  ]Combined [HFpEF & HFrEF]     [  ]Other:  -------------------------------------------------------------------  [  ]ZARIA     [  ]ATN     [  ]Reneal Medullary Necrosis     [  ]Renal Cortical Necrosis     [  ]Other Pathological Lesions:    [  ]CKD 1  [  ]CKD 2  [  ]CKD 3  [  ]CKD 4  [  ]CKD 5  [  ]Other  -------------------------------------------------------------------  [  ]Other/Unspecified:    --------------------------------------------------------------------    Abdominal Nutritional Status  [  ]Malnutrition: See Nutrition Note  [  ]Cachexia  [  ]Other:   [  ]Supplement Ordered:  [  ]Morbid Obesity (BMI >=40] Patient is a 83y old  Female who presents with a chief complaint of NSTEMI (2019 07:23)      INTERVAL HPI/OVERNIGHT EVENTS:  No acute overnight events.  Reports feeling fine.          Review of Systems: 12 point review of systems otherwise negative  ( - )fevers/chills  ( - ) dyspnea  ( - ) cough  ( - ) chest pain  ( - ) palpitations  ( - ) dizziness/lightheadedness  ( - ) nausea/vomiting  ( - ) abd pain  ( - ) diarrhea  ( - ) melena  ( - ) hematochezia  ( - ) dysuria  ( - ) hematuria  ( - ) leg swelling  ( -) calf tenderness  ( - ) motor weakness  ( - ) extremity numbness  ( - ) back pain  ( + ) tolerating POs  ( + ) BM    MEDICATIONS  (STANDING):  aspirin enteric coated 81 milliGRAM(s) Oral daily  atorvastatin 80 milliGRAM(s) Oral at bedtime  cefuroxime  IVPB 1500 milliGRAM(s) IV Intermittent once  chlorhexidine 0.12% Liquid 30 milliLiter(s) Swish and Spit once  chlorhexidine 4% Liquid 1 Application(s) Topical once  dextrose 5%. 1000 milliLiter(s) (50 mL/Hr) IV Continuous <Continuous>  dextrose 50% Injectable 12.5 Gram(s) IV Push once  dextrose 50% Injectable 25 Gram(s) IV Push once  dextrose 50% Injectable 25 Gram(s) IV Push once  heparin  Infusion. 700 Unit(s)/Hr (7 mL/Hr) IV Continuous <Continuous>  insulin lispro (HumaLOG) corrective regimen sliding scale   SubCutaneous three times a day before meals  insulin lispro (HumaLOG) corrective regimen sliding scale   SubCutaneous at bedtime  metoprolol tartrate 12.5 milliGRAM(s) Oral two times a day    MEDICATIONS  (PRN):  dextrose 40% Gel 15 Gram(s) Oral once PRN Blood Glucose LESS THAN 70 milliGRAM(s)/deciliter  glucagon  Injectable 1 milliGRAM(s) IntraMuscular once PRN Glucose LESS THAN 70 milligrams/deciliter  heparin  Injectable 4400 Unit(s) IV Push every 6 hours PRN For aPTT less than 40      Allergies    No Known Allergies    Intolerances          Vital Signs Last 24 Hrs  T(C): 36.3 (2019 13:05), Max: 36.7 (10 Apr 2019 14:38)  T(F): 97.4 (2019 13:05), Max: 98.1 (10 Apr 2019 14:38)  HR: 60 (2019 13:05) (60 - 88)  BP: 133/53 (2019 13:05) (112/68 - 138/71)  BP(mean): --  RR: 18 (2019 13:05) (18 - 20)  SpO2: 97% (2019 13:05) (97% - 100%)  CAPILLARY BLOOD GLUCOSE      POCT Blood Glucose.: 147 mg/dL (2019 13:07)  POCT Blood Glucose.: 142 mg/dL (2019 09:10)  POCT Blood Glucose.: 236 mg/dL (10 Apr 2019 21:22)  POCT Blood Glucose.: 155 mg/dL (10 Apr 2019 17:52)      04-10 @ 07:01  -  04-11 @ 07:00  --------------------------------------------------------  IN: 952 mL / OUT: 0 mL / NET: 952 mL        Physical Exam:    Daily     Daily Weight in k (2019 04:54)  General:  NAD  HEENT:  Nonicteric, PERRLA  CV:  RRR, no murmur  Lungs:  CTA B/L, no wheeze  Abdomen:  Soft, non-tender, no distended  Extremities:  no edema  Skin:  Warm and dry, no rashes  :  No epstein  Neuro:  AAOx3, non-focal  No Restraints    LABS:                        11.4   5.5   )-----------( 228      ( 2019 06:49 )             33.7     04-10    136  |  99  |  18  ----------------------------<  172<H>  4.6   |  24  |  0.90    Ca    9.7      10 Apr 2019 14:50      PTT - ( 2019 06:49 )  PTT:61.1 sec        RADIOLOGY & ADDITIONAL TESTS:    ---------------------------------------------------------------------------  I personally reviewed: [  ]EKG   [  ]CXR    [  ] CT    [  ]Other  ---------------------------------------------------------------------------  PLEASE CHECK WHEN PRESENT:     [  ]Heart Failure     [  ] Acute     [  ] Acute on Chronic     [  ] Chronic  -------------------------------------------------------------------     [  ]Diastolic [HFpEF]     [  ]Systolic [HFrEF]     [  ]Combined [HFpEF & HFrEF]     [  ]Other:  -------------------------------------------------------------------  [  ]ZARIA     [  ]ATN     [  ]Reneal Medullary Necrosis     [  ]Renal Cortical Necrosis     [  ]Other Pathological Lesions:    [  ]CKD 1  [  ]CKD 2  [  ]CKD 3  [  ]CKD 4  [  ]CKD 5  [  ]Other  -------------------------------------------------------------------  [  ]Other/Unspecified:    --------------------------------------------------------------------    Abdominal Nutritional Status  [  ]Malnutrition: See Nutrition Note  [  ]Cachexia  [  ]Other:   [  ]Supplement Ordered:  [  ]Morbid Obesity (BMI >=40]

## 2019-04-11 NOTE — PROGRESS NOTE ADULT - SUBJECTIVE AND OBJECTIVE BOX
Cardiac Surgery Pre-op Note:  CC: Patient is a 83y old  Female who presents with a chief complaint of NSTEMI (11 Apr 2019 14:14)      Referring Physician:   Dr. Tripp                                                                                          Surgeon:  Dr. Barba  Procedure: 4/12/19 CABGx3    Allergies    No Known Allergies    Intolerances      HPI:  83 F PMH T2DM, HLD, Vit D deficiency, p/w one episode of acute onset chest pain today. Mid sternal, non radiating, sharp, precipitated by exertion (pt was carrying heavy grocery bags) and relieved by rest. Never had similar sx before. Denies associated sob, palpitations, LE edema, orthopnea.  NO prior provocative testing, no prior aspirin use. Unk baseline ekg. +FH early CAD with MI (brother, mother both with MI in 50s). Lifelong nonsmoker, no etoh or other toxic habits. Pt presented to [facility] where EKG showed signs of inferolateral ischemia, and troponin was elevated. s/p aspirin load, plavix load, hep gtt, and transferred to [facility] for cards eval and cardiac cath. Currently chest pain free, resting comfortably.     VS: 98.0, 70, 129/74, 18, 97% ra. Labs: anemia no baseline, rest cbc unrevealing, PTT initially supratherapeutic, repeat within range,  --> 548, cmp with hyperglycemia otherwise unrevealing.  CXR prelim clear lungs. (09 Apr 2019 05:31)      PAST MEDICAL & SURGICAL HISTORY:  Hyperlipidemia  Diabetes mellitus  H/O abdominal hysterectomy      MEDICATIONS  (STANDING):  aspirin enteric coated 81 milliGRAM(s) Oral daily  atorvastatin 80 milliGRAM(s) Oral at bedtime  cefuroxime  IVPB 1500 milliGRAM(s) IV Intermittent once  chlorhexidine 0.12% Liquid 30 milliLiter(s) Swish and Spit once  chlorhexidine 4% Liquid 1 Application(s) Topical once  heparin  Infusion. 700 Unit(s)/Hr (7 mL/Hr) IV Continuous <Continuous>  insulin lispro (HumaLOG) corrective regimen sliding scale   SubCutaneous three times a day before meals  insulin lispro (HumaLOG) corrective regimen sliding scale   SubCutaneous at bedtime  metoprolol tartrate 12.5 milliGRAM(s) Oral two times a day    MEDICATIONS  (PRN):  dextrose 40% Gel 15 Gram(s) Oral once PRN Blood Glucose LESS THAN 70 milliGRAM(s)/deciliter  glucagon  Injectable 1 milliGRAM(s) IntraMuscular once PRN Glucose LESS THAN 70 milligrams/deciliter  heparin  Injectable 4400 Unit(s) IV Push every 6 hours PRN For aPTT less than 40      Labs:                        11.4   5.5   )-----------( 228      ( 11 Apr 2019 06:49 )             33.7     136  |  99  |  18  ----------------------------<  172<H>  4.6   |  24  |  0.90    PTT - ( 11 Apr 2019 06:49 )  PTT:61.1 sec    Blood Type: ABO Interpretation: B (04-10 @ 06:06)  HGB A1C: Hemoglobin A1C, Whole Blood: 8.1 % (04-10 @ 09:48)  Pro-BNP: Serum Pro-Brain Natriuretic Peptide: 1759 pg/mL (04-10 @ 07:42)  Thyroid Panel: 04-10 @ 09:15/5.43/5.1/90  MRSA/MSSA PCR Result.: NotDetec (04-09 @ 22:43)      < from: Xray Chest 1 View AP/PA (04.09.19 @ 01:51) FINDINGS: Cardiac silhouette is within normal limits. Lungs are clear. No pleural effusions or pneumothorax. No acute osseous abnormality. IMPRESSION: Clear lungs.    < from: VA Duplex Carotid, Bilat (04.10.19 @ 12:14) INTERPRETATION:  No hemodynamically significant stenosis.    PFT's: completed     < from: Transthoracic Echocardiogram (04.09.19 @ 16:29) 1. Mitral annular calcification and calcified mitral leaflets with normal diastolic opening. Moderate mitral  regurgitation. 2. Calcified trileaflet aortic valve with normal opening. No aortic valve regurgitation seen. Normal left ventricular systolic function. No segmental wall motion abnormalities.  Normal right ventricular size and function.    < from: Cardiac Cath Lab - Adult (04.09.19 @ 11:26) The coronary circulation is right dominant.  LM:   --  LM: Angiography showed minor luminal irregularities with no flow  limiting lesions.  LAD:   --  Proximal LAD: There was a 99 % stenosis.  CX:   --Mid circumflex: There was a 90 % stenosis.  RCA:   --  RCA: Angiography showed minor luminal irregularities with no  flow limiting lesions.  COMPLICATIONS: There were no complications.      Gen: WN/WD NAD  Neuro: AAOx3, nonfocal  Pulm: CTA B/L  CV: RRR, S1S2 +systolic murmur  Abd: Soft, NT, ND +BS  Ext: No edema, + peripheral pulses      Pt has AICD/PPM [ ] Yes  [x ] No          Pre-op Beta Blocker ordered within 24 hrs of surgery (CABG ONLY)?  [x ] Yes  [ ] No  If not, Why?  Type & Cross  [X] Yes  [ ] No  NPO after Midnight [x ] Yes  [ ] No  Pre-op ABX ordered, to be taped on chart:  [ x] Yes  [ ] No     Hibiclens/Peridex ordered [x ] Yes  [ ] No  Intraop on Hold: PRBCs, CXR, JORGE [x ]   Consent obtained  [x ] Yes  [ ] No

## 2019-04-12 ENCOUNTER — APPOINTMENT (OUTPATIENT)
Dept: CARDIOTHORACIC SURGERY | Facility: HOSPITAL | Age: 84
End: 2019-04-12

## 2019-04-12 LAB
ALBUMIN SERPL ELPH-MCNC: 3.4 G/DL — SIGNIFICANT CHANGE UP (ref 3.3–5)
ALP SERPL-CCNC: 35 U/L — LOW (ref 40–120)
ALT FLD-CCNC: 31 U/L — SIGNIFICANT CHANGE UP (ref 10–45)
ANION GAP SERPL CALC-SCNC: 14 MMOL/L — SIGNIFICANT CHANGE UP (ref 5–17)
APTT BLD: 35.7 SEC — SIGNIFICANT CHANGE UP (ref 27.5–36.3)
APTT BLD: 60.6 SEC — HIGH (ref 27.5–36.3)
AST SERPL-CCNC: 58 U/L — HIGH (ref 10–40)
BASOPHILS # BLD AUTO: 0 K/UL — SIGNIFICANT CHANGE UP (ref 0–0.2)
BASOPHILS NFR BLD AUTO: 0.3 % — SIGNIFICANT CHANGE UP (ref 0–2)
BILIRUB SERPL-MCNC: 0.9 MG/DL — SIGNIFICANT CHANGE UP (ref 0.2–1.2)
BUN SERPL-MCNC: 10 MG/DL — SIGNIFICANT CHANGE UP (ref 7–23)
CALCIUM SERPL-MCNC: 8 MG/DL — LOW (ref 8.4–10.5)
CHLORIDE SERPL-SCNC: 103 MMOL/L — SIGNIFICANT CHANGE UP (ref 96–108)
CK MB BLD-MCNC: 12.6 % — HIGH (ref 0–3.5)
CK MB CFR SERPL CALC: 28.3 NG/ML — HIGH (ref 0–3.8)
CK SERPL-CCNC: 224 U/L — HIGH (ref 25–170)
CO2 SERPL-SCNC: 22 MMOL/L — SIGNIFICANT CHANGE UP (ref 22–31)
CREAT SERPL-MCNC: 0.52 MG/DL — SIGNIFICANT CHANGE UP (ref 0.5–1.3)
EOSINOPHIL # BLD AUTO: 0.1 K/UL — SIGNIFICANT CHANGE UP (ref 0–0.5)
EOSINOPHIL NFR BLD AUTO: 0.8 % — SIGNIFICANT CHANGE UP (ref 0–6)
FIBRINOGEN PPP-MCNC: 354 MG/DL — SIGNIFICANT CHANGE UP (ref 350–510)
GAS PNL BLDA: SIGNIFICANT CHANGE UP
GLUCOSE BLDC GLUCOMTR-MCNC: 132 MG/DL — HIGH (ref 70–99)
GLUCOSE SERPL-MCNC: 211 MG/DL — HIGH (ref 70–99)
HCT VFR BLD CALC: 30.1 % — LOW (ref 34.5–45)
HCT VFR BLD CALC: 37.6 % — SIGNIFICANT CHANGE UP (ref 34.5–45)
HGB BLD-MCNC: 10.3 G/DL — LOW (ref 11.5–15.5)
HGB BLD-MCNC: 12.1 G/DL — SIGNIFICANT CHANGE UP (ref 11.5–15.5)
INR BLD: 1.33 RATIO — HIGH (ref 0.88–1.16)
LYMPHOCYTES # BLD AUTO: 1.4 K/UL — SIGNIFICANT CHANGE UP (ref 1–3.3)
LYMPHOCYTES # BLD AUTO: 13.8 % — SIGNIFICANT CHANGE UP (ref 13–44)
MAGNESIUM SERPL-MCNC: 2.1 MG/DL — SIGNIFICANT CHANGE UP (ref 1.6–2.6)
MCHC RBC-ENTMCNC: 29.1 PG — SIGNIFICANT CHANGE UP (ref 27–34)
MCHC RBC-ENTMCNC: 29.9 PG — SIGNIFICANT CHANGE UP (ref 27–34)
MCHC RBC-ENTMCNC: 32.2 GM/DL — SIGNIFICANT CHANGE UP (ref 32–36)
MCHC RBC-ENTMCNC: 34.3 GM/DL — SIGNIFICANT CHANGE UP (ref 32–36)
MCV RBC AUTO: 87.2 FL — SIGNIFICANT CHANGE UP (ref 80–100)
MCV RBC AUTO: 90.4 FL — SIGNIFICANT CHANGE UP (ref 80–100)
MONOCYTES # BLD AUTO: 0.4 K/UL — SIGNIFICANT CHANGE UP (ref 0–0.9)
MONOCYTES NFR BLD AUTO: 3.7 % — SIGNIFICANT CHANGE UP (ref 2–14)
NEUTROPHILS # BLD AUTO: 8.2 K/UL — HIGH (ref 1.8–7.4)
NEUTROPHILS NFR BLD AUTO: 81.4 % — HIGH (ref 43–77)
PHOSPHATE SERPL-MCNC: 2.3 MG/DL — LOW (ref 2.5–4.5)
PLATELET # BLD AUTO: 138 K/UL — LOW (ref 150–400)
PLATELET # BLD AUTO: 260 K/UL — SIGNIFICANT CHANGE UP (ref 150–400)
POTASSIUM SERPL-MCNC: 4.2 MMOL/L — SIGNIFICANT CHANGE UP (ref 3.5–5.3)
POTASSIUM SERPL-SCNC: 4.2 MMOL/L — SIGNIFICANT CHANGE UP (ref 3.5–5.3)
PROT SERPL-MCNC: 5 G/DL — LOW (ref 6–8.3)
PROTHROM AB SERPL-ACNC: 15.4 SEC — HIGH (ref 10–12.9)
RBC # BLD: 3.45 M/UL — LOW (ref 3.8–5.2)
RBC # BLD: 4.16 M/UL — SIGNIFICANT CHANGE UP (ref 3.8–5.2)
RBC # FLD: 12 % — SIGNIFICANT CHANGE UP (ref 10.3–14.5)
RBC # FLD: 12.1 % — SIGNIFICANT CHANGE UP (ref 10.3–14.5)
SODIUM SERPL-SCNC: 139 MMOL/L — SIGNIFICANT CHANGE UP (ref 135–145)
TROPONIN T, HIGH SENSITIVITY RESULT: 872 NG/L — HIGH (ref 0–51)
WBC # BLD: 10 K/UL — SIGNIFICANT CHANGE UP (ref 3.8–10.5)
WBC # BLD: 5.9 K/UL — SIGNIFICANT CHANGE UP (ref 3.8–10.5)
WBC # FLD AUTO: 10 K/UL — SIGNIFICANT CHANGE UP (ref 3.8–10.5)
WBC # FLD AUTO: 5.9 K/UL — SIGNIFICANT CHANGE UP (ref 3.8–10.5)

## 2019-04-12 PROCEDURE — 33518 CABG ARTERY-VEIN TWO: CPT | Mod: AS

## 2019-04-12 PROCEDURE — 33533 CABG ARTERIAL SINGLE: CPT

## 2019-04-12 PROCEDURE — 33518 CABG ARTERY-VEIN TWO: CPT

## 2019-04-12 PROCEDURE — 99291 CRITICAL CARE FIRST HOUR: CPT

## 2019-04-12 PROCEDURE — 33508 ENDOSCOPIC VEIN HARVEST: CPT

## 2019-04-12 PROCEDURE — 33508 ENDOSCOPIC VEIN HARVEST: CPT | Mod: AS

## 2019-04-12 PROCEDURE — 71045 X-RAY EXAM CHEST 1 VIEW: CPT | Mod: 26

## 2019-04-12 PROCEDURE — 33533 CABG ARTERIAL SINGLE: CPT | Mod: AS

## 2019-04-12 RX ORDER — ASPIRIN/CALCIUM CARB/MAGNESIUM 324 MG
300 TABLET ORAL ONCE
Qty: 0 | Refills: 0 | Status: COMPLETED | OUTPATIENT
Start: 2019-04-12

## 2019-04-12 RX ORDER — POTASSIUM CHLORIDE 20 MEQ
10 PACKET (EA) ORAL
Qty: 0 | Refills: 0 | Status: DISCONTINUED | OUTPATIENT
Start: 2019-04-12 | End: 2019-04-15

## 2019-04-12 RX ORDER — CHLORHEXIDINE GLUCONATE 213 G/1000ML
5 SOLUTION TOPICAL EVERY 4 HOURS
Qty: 0 | Refills: 0 | Status: DISCONTINUED | OUTPATIENT
Start: 2019-04-12 | End: 2019-04-14

## 2019-04-12 RX ORDER — FAMOTIDINE 10 MG/ML
20 INJECTION INTRAVENOUS
Qty: 0 | Refills: 0 | Status: DISCONTINUED | OUTPATIENT
Start: 2019-04-12 | End: 2019-04-14

## 2019-04-12 RX ORDER — OXYCODONE AND ACETAMINOPHEN 5; 325 MG/1; MG/1
2 TABLET ORAL EVERY 6 HOURS
Qty: 0 | Refills: 0 | Status: DISCONTINUED | OUTPATIENT
Start: 2019-04-12 | End: 2019-04-16

## 2019-04-12 RX ORDER — SODIUM CHLORIDE 9 MG/ML
500 INJECTION, SOLUTION INTRAVENOUS ONCE
Qty: 0 | Refills: 0 | Status: COMPLETED | OUTPATIENT
Start: 2019-04-12 | End: 2019-04-12

## 2019-04-12 RX ORDER — POTASSIUM CHLORIDE 20 MEQ
10 PACKET (EA) ORAL
Qty: 0 | Refills: 0 | Status: COMPLETED | OUTPATIENT
Start: 2019-04-12 | End: 2019-04-12

## 2019-04-12 RX ORDER — FENTANYL CITRATE 50 UG/ML
25 INJECTION INTRAVENOUS ONCE
Qty: 0 | Refills: 0 | Status: DISCONTINUED | OUTPATIENT
Start: 2019-04-12 | End: 2019-04-12

## 2019-04-12 RX ORDER — FAMOTIDINE 10 MG/ML
20 INJECTION INTRAVENOUS
Qty: 0 | Refills: 0 | Status: DISCONTINUED | OUTPATIENT
Start: 2019-04-12 | End: 2019-04-12

## 2019-04-12 RX ORDER — ASPIRIN/CALCIUM CARB/MAGNESIUM 324 MG
81 TABLET ORAL DAILY
Qty: 0 | Refills: 0 | Status: DISCONTINUED | OUTPATIENT
Start: 2019-04-12 | End: 2019-04-16

## 2019-04-12 RX ORDER — CEFUROXIME AXETIL 250 MG
1500 TABLET ORAL EVERY 8 HOURS
Qty: 0 | Refills: 0 | Status: COMPLETED | OUTPATIENT
Start: 2019-04-12 | End: 2019-04-14

## 2019-04-12 RX ORDER — DOCUSATE SODIUM 100 MG
100 CAPSULE ORAL THREE TIMES A DAY
Qty: 0 | Refills: 0 | Status: DISCONTINUED | OUTPATIENT
Start: 2019-04-12 | End: 2019-04-16

## 2019-04-12 RX ORDER — INSULIN HUMAN 100 [IU]/ML
3 INJECTION, SOLUTION SUBCUTANEOUS
Qty: 100 | Refills: 0 | Status: DISCONTINUED | OUTPATIENT
Start: 2019-04-12 | End: 2019-04-13

## 2019-04-12 RX ORDER — METOCLOPRAMIDE HCL 10 MG
10 TABLET ORAL EVERY 8 HOURS
Qty: 0 | Refills: 0 | Status: COMPLETED | OUTPATIENT
Start: 2019-04-12 | End: 2019-04-14

## 2019-04-12 RX ORDER — DEXTROSE 50 % IN WATER 50 %
50 SYRINGE (ML) INTRAVENOUS
Qty: 0 | Refills: 0 | Status: DISCONTINUED | OUTPATIENT
Start: 2019-04-12 | End: 2019-04-16

## 2019-04-12 RX ORDER — SODIUM CHLORIDE 9 MG/ML
1000 INJECTION INTRAMUSCULAR; INTRAVENOUS; SUBCUTANEOUS
Qty: 0 | Refills: 0 | Status: DISCONTINUED | OUTPATIENT
Start: 2019-04-12 | End: 2019-04-14

## 2019-04-12 RX ORDER — MEPERIDINE HYDROCHLORIDE 50 MG/ML
25 INJECTION INTRAMUSCULAR; INTRAVENOUS; SUBCUTANEOUS ONCE
Qty: 0 | Refills: 0 | Status: DISCONTINUED | OUTPATIENT
Start: 2019-04-12 | End: 2019-04-14

## 2019-04-12 RX ORDER — POLYETHYLENE GLYCOL 3350 17 G/17G
17 POWDER, FOR SOLUTION ORAL DAILY
Qty: 0 | Refills: 0 | Status: DISCONTINUED | OUTPATIENT
Start: 2019-04-12 | End: 2019-04-16

## 2019-04-12 RX ORDER — ACETAMINOPHEN 500 MG
1000 TABLET ORAL ONCE
Qty: 0 | Refills: 0 | Status: COMPLETED | OUTPATIENT
Start: 2019-04-12 | End: 2019-04-12

## 2019-04-12 RX ORDER — SODIUM CHLORIDE 9 MG/ML
1000 INJECTION, SOLUTION INTRAVENOUS
Qty: 0 | Refills: 0 | Status: DISCONTINUED | OUTPATIENT
Start: 2019-04-12 | End: 2019-04-14

## 2019-04-12 RX ORDER — PANTOPRAZOLE SODIUM 20 MG/1
40 TABLET, DELAYED RELEASE ORAL DAILY
Qty: 0 | Refills: 0 | Status: DISCONTINUED | OUTPATIENT
Start: 2019-04-12 | End: 2019-04-12

## 2019-04-12 RX ORDER — DEXTROSE 50 % IN WATER 50 %
25 SYRINGE (ML) INTRAVENOUS
Qty: 0 | Refills: 0 | Status: DISCONTINUED | OUTPATIENT
Start: 2019-04-12 | End: 2019-04-16

## 2019-04-12 RX ORDER — DEXMEDETOMIDINE HYDROCHLORIDE IN 0.9% SODIUM CHLORIDE 4 UG/ML
0.3 INJECTION INTRAVENOUS
Qty: 200 | Refills: 0 | Status: DISCONTINUED | OUTPATIENT
Start: 2019-04-12 | End: 2019-04-14

## 2019-04-12 RX ORDER — OXYCODONE AND ACETAMINOPHEN 5; 325 MG/1; MG/1
1 TABLET ORAL EVERY 6 HOURS
Qty: 0 | Refills: 0 | Status: DISCONTINUED | OUTPATIENT
Start: 2019-04-12 | End: 2019-04-16

## 2019-04-12 RX ADMIN — Medication 12.5 MILLIGRAM(S): at 06:36

## 2019-04-12 RX ADMIN — Medication 100 MILLIEQUIVALENT(S): at 21:58

## 2019-04-12 RX ADMIN — CHLORHEXIDINE GLUCONATE 5 MILLILITER(S): 213 SOLUTION TOPICAL at 22:19

## 2019-04-12 RX ADMIN — Medication 100 MILLIEQUIVALENT(S): at 21:10

## 2019-04-12 RX ADMIN — FAMOTIDINE 20 MILLIGRAM(S): 10 INJECTION INTRAVENOUS at 22:19

## 2019-04-12 RX ADMIN — Medication 400 MILLIGRAM(S): at 21:28

## 2019-04-12 RX ADMIN — Medication 1000 MILLIGRAM(S): at 22:00

## 2019-04-12 RX ADMIN — Medication 100 MILLIEQUIVALENT(S): at 19:55

## 2019-04-12 RX ADMIN — Medication 100 MILLIEQUIVALENT(S): at 19:15

## 2019-04-12 RX ADMIN — SODIUM CHLORIDE 1000 MILLILITER(S): 9 INJECTION, SOLUTION INTRAVENOUS at 22:15

## 2019-04-12 RX ADMIN — CHLORHEXIDINE GLUCONATE 30 MILLILITER(S): 213 SOLUTION TOPICAL at 06:39

## 2019-04-12 RX ADMIN — CHLORHEXIDINE GLUCONATE 1 APPLICATION(S): 213 SOLUTION TOPICAL at 08:00

## 2019-04-12 RX ADMIN — Medication 100 MILLIGRAM(S): at 22:14

## 2019-04-12 RX ADMIN — Medication 10 MILLIGRAM(S): at 22:19

## 2019-04-12 RX ADMIN — DEXMEDETOMIDINE HYDROCHLORIDE IN 0.9% SODIUM CHLORIDE 5.57 MICROGRAM(S)/KG/HR: 4 INJECTION INTRAVENOUS at 21:29

## 2019-04-12 RX ADMIN — INSULIN HUMAN 3 UNIT(S)/HR: 100 INJECTION, SOLUTION SUBCUTANEOUS at 18:58

## 2019-04-12 RX ADMIN — FENTANYL CITRATE 25 MICROGRAM(S): 50 INJECTION INTRAVENOUS at 20:45

## 2019-04-12 RX ADMIN — HEPARIN SODIUM 550 UNIT(S)/HR: 5000 INJECTION INTRAVENOUS; SUBCUTANEOUS at 07:05

## 2019-04-12 RX ADMIN — Medication 100 MILLIEQUIVALENT(S): at 21:31

## 2019-04-12 RX ADMIN — FENTANYL CITRATE 25 MICROGRAM(S): 50 INJECTION INTRAVENOUS at 21:00

## 2019-04-12 NOTE — PROGRESS NOTE ADULT - SUBJECTIVE AND OBJECTIVE BOX
JANET ROCHA  MRN#:  [id number]    The patient is a 83y Female with PMH T2DM, HLD, admitted with NSTEMI and found to have multivessel CAD, s/p C3L today, who was seen, evaluated, & examined with the CTICU staff on admission and later in the evening with a multidisciplinary care plan formulated & implemented.  All available clinical, laboratory, radiographic, pharmacologic, and electrocardiographic data were reviewed & analyzed.      The patient was in the CTICU in critical condition secondary to persistent cardiopulmonary dysfunction, hemodynamically significant bradycardia, & hyperglycemia.      Respiratory status required full ventilatory support, the following of ABG’s with A-line monitoring, continuous pulse oximetry monitoring, and an IV Dexmedetomidine infusion for support & to evaluate for & prevent further decompensation secondary to persistent cardiopulmonary dysfunction.     Invasive hemodynamic monitoring with a central venous catheter & an A-line were required for the continuous central venous and MAP/BP monitoring to ensure adequate cardiovascular support and to evaluate for & help prevent decompensation while receiving intermittent volume expansion and external epicardial pacing secondary to hypovolemia, acute postoperative blood loss anemia and hemodynamically significant bradycardia.       Metabolic stability, uncontrolled type 2 diabetes-hyperglycemia, & infection prophylaxis required an IV regular Insulin drip & the following of serial glucose levels to help achieve & maintain euglycemia.      Patient required acute postoperative critical care management and I provided 35 minutes of non-continuous care to the patient.  Discussed at length with the CTICU staff and helped coordinate care. JANET ROCHA  MRN#:  [id number]    The patient is a 83y Female with PMH T2DM, HLD, admitted with NSTEMI and found to have multivessel CAD, s/p C3L today, who was seen, evaluated, & examined with the CTICU staff on admission and later in the evening with a multidisciplinary care plan formulated & implemented.  All available clinical, laboratory, radiographic, pharmacologic, and electrocardiographic data were reviewed & analyzed.      The patient was in the CTICU in critical condition secondary to persistent cardiopulmonary dysfunction, hemodynamically significant bradycardia, & hyperglycemia.      Respiratory status required full ventilatory support, the following of ABG’s with A-line monitoring, continuous pulse oximetry monitoring, and an IV Dexmedetomidine infusion for support & to evaluate for & prevent further decompensation secondary to persistent cardiopulmonary dysfunction.     Invasive hemodynamic monitoring with a central venous catheter & an A-line were required for the continuous central venous and MAP/BP monitoring to ensure adequate cardiovascular support and to evaluate for & help prevent decompensation while receiving intermittent volume expansion and external epicardial pacing secondary to hypovolemia, acute postoperative blood loss anemia and hemodynamically significant bradycardia.       Metabolic stability, uncontrolled type 2 diabetes-hyperglycemia, & infection prophylaxis required an IV regular Insulin drip & the following of serial glucose levels to help achieve & maintain euglycemia.      Patient required acute postoperative critical care management and I provided 30 minutes of non-continuous care to the patient.  Discussed at length with the CTICU staff and helped coordinate care.

## 2019-04-12 NOTE — BRIEF OPERATIVE NOTE - NSICDXBRIEFPROCEDURE_GEN_ALL_CORE_FT
PROCEDURES:  CABG, with JORGE 12-Apr-2019 18:31:13 CABGx3 (LIMA-LAD, SVG-Diag, SVG- OM) Nayeli Cortes D

## 2019-04-13 LAB
ALBUMIN SERPL ELPH-MCNC: 3.5 G/DL — SIGNIFICANT CHANGE UP (ref 3.3–5)
ALP SERPL-CCNC: 38 U/L — LOW (ref 40–120)
ALT FLD-CCNC: 45 U/L — SIGNIFICANT CHANGE UP (ref 10–45)
ANION GAP SERPL CALC-SCNC: 13 MMOL/L — SIGNIFICANT CHANGE UP (ref 5–17)
APTT BLD: 32.9 SEC — SIGNIFICANT CHANGE UP (ref 27.5–36.3)
AST SERPL-CCNC: 81 U/L — HIGH (ref 10–40)
BASOPHILS # BLD AUTO: 0 K/UL — SIGNIFICANT CHANGE UP (ref 0–0.2)
BASOPHILS NFR BLD AUTO: 0.1 % — SIGNIFICANT CHANGE UP (ref 0–2)
BILIRUB SERPL-MCNC: 0.5 MG/DL — SIGNIFICANT CHANGE UP (ref 0.2–1.2)
BUN SERPL-MCNC: 11 MG/DL — SIGNIFICANT CHANGE UP (ref 7–23)
CALCIUM SERPL-MCNC: 8.1 MG/DL — LOW (ref 8.4–10.5)
CHLORIDE SERPL-SCNC: 104 MMOL/L — SIGNIFICANT CHANGE UP (ref 96–108)
CK MB BLD-MCNC: 9.8 % — HIGH (ref 0–3.5)
CK MB CFR SERPL CALC: 28.5 NG/ML — HIGH (ref 0–3.8)
CK SERPL-CCNC: 291 U/L — HIGH (ref 25–170)
CO2 SERPL-SCNC: 22 MMOL/L — SIGNIFICANT CHANGE UP (ref 22–31)
CREAT SERPL-MCNC: 0.57 MG/DL — SIGNIFICANT CHANGE UP (ref 0.5–1.3)
EOSINOPHIL # BLD AUTO: 0 K/UL — SIGNIFICANT CHANGE UP (ref 0–0.5)
EOSINOPHIL NFR BLD AUTO: 0.1 % — SIGNIFICANT CHANGE UP (ref 0–6)
GAS PNL BLDA: SIGNIFICANT CHANGE UP
GLUCOSE BLDC GLUCOMTR-MCNC: 100 MG/DL — HIGH (ref 70–99)
GLUCOSE BLDC GLUCOMTR-MCNC: 107 MG/DL — HIGH (ref 70–99)
GLUCOSE BLDC GLUCOMTR-MCNC: 110 MG/DL — HIGH (ref 70–99)
GLUCOSE BLDC GLUCOMTR-MCNC: 112 MG/DL — HIGH (ref 70–99)
GLUCOSE BLDC GLUCOMTR-MCNC: 126 MG/DL — HIGH (ref 70–99)
GLUCOSE BLDC GLUCOMTR-MCNC: 130 MG/DL — HIGH (ref 70–99)
GLUCOSE BLDC GLUCOMTR-MCNC: 130 MG/DL — HIGH (ref 70–99)
GLUCOSE BLDC GLUCOMTR-MCNC: 132 MG/DL — HIGH (ref 70–99)
GLUCOSE BLDC GLUCOMTR-MCNC: 137 MG/DL — HIGH (ref 70–99)
GLUCOSE BLDC GLUCOMTR-MCNC: 145 MG/DL — HIGH (ref 70–99)
GLUCOSE SERPL-MCNC: 127 MG/DL — HIGH (ref 70–99)
HCT VFR BLD CALC: 30.8 % — LOW (ref 34.5–45)
HGB BLD-MCNC: 10.6 G/DL — LOW (ref 11.5–15.5)
INR BLD: 1.13 RATIO — SIGNIFICANT CHANGE UP (ref 0.88–1.16)
LYMPHOCYTES # BLD AUTO: 0.7 K/UL — LOW (ref 1–3.3)
LYMPHOCYTES # BLD AUTO: 6.9 % — LOW (ref 13–44)
MAGNESIUM SERPL-MCNC: 2.1 MG/DL — SIGNIFICANT CHANGE UP (ref 1.6–2.6)
MCHC RBC-ENTMCNC: 30.4 PG — SIGNIFICANT CHANGE UP (ref 27–34)
MCHC RBC-ENTMCNC: 34.5 GM/DL — SIGNIFICANT CHANGE UP (ref 32–36)
MCV RBC AUTO: 88.3 FL — SIGNIFICANT CHANGE UP (ref 80–100)
MONOCYTES # BLD AUTO: 0.6 K/UL — SIGNIFICANT CHANGE UP (ref 0–0.9)
MONOCYTES NFR BLD AUTO: 5.4 % — SIGNIFICANT CHANGE UP (ref 2–14)
NEUTROPHILS # BLD AUTO: 9.4 K/UL — HIGH (ref 1.8–7.4)
NEUTROPHILS NFR BLD AUTO: 87.5 % — HIGH (ref 43–77)
PHOSPHATE SERPL-MCNC: 3 MG/DL — SIGNIFICANT CHANGE UP (ref 2.5–4.5)
PLATELET # BLD AUTO: 164 K/UL — SIGNIFICANT CHANGE UP (ref 150–400)
POTASSIUM SERPL-MCNC: 4.5 MMOL/L — SIGNIFICANT CHANGE UP (ref 3.5–5.3)
POTASSIUM SERPL-SCNC: 4.5 MMOL/L — SIGNIFICANT CHANGE UP (ref 3.5–5.3)
PROT SERPL-MCNC: 5.4 G/DL — LOW (ref 6–8.3)
PROTHROM AB SERPL-ACNC: 13.1 SEC — HIGH (ref 10–12.9)
RBC # BLD: 3.49 M/UL — LOW (ref 3.8–5.2)
RBC # FLD: 12.4 % — SIGNIFICANT CHANGE UP (ref 10.3–14.5)
SODIUM SERPL-SCNC: 139 MMOL/L — SIGNIFICANT CHANGE UP (ref 135–145)
TROPONIN T, HIGH SENSITIVITY RESULT: 1012 NG/L — HIGH (ref 0–51)
WBC # BLD: 10.8 K/UL — HIGH (ref 3.8–10.5)
WBC # FLD AUTO: 10.8 K/UL — HIGH (ref 3.8–10.5)

## 2019-04-13 PROCEDURE — 99291 CRITICAL CARE FIRST HOUR: CPT

## 2019-04-13 PROCEDURE — 71045 X-RAY EXAM CHEST 1 VIEW: CPT | Mod: 26

## 2019-04-13 PROCEDURE — 93010 ELECTROCARDIOGRAM REPORT: CPT

## 2019-04-13 RX ORDER — METOPROLOL TARTRATE 50 MG
25 TABLET ORAL
Qty: 0 | Refills: 0 | Status: DISCONTINUED | OUTPATIENT
Start: 2019-04-13 | End: 2019-04-14

## 2019-04-13 RX ORDER — ACETAMINOPHEN 500 MG
1000 TABLET ORAL ONCE
Qty: 0 | Refills: 0 | Status: COMPLETED | OUTPATIENT
Start: 2019-04-13 | End: 2019-04-13

## 2019-04-13 RX ORDER — SODIUM CHLORIDE 9 MG/ML
500 INJECTION, SOLUTION INTRAVENOUS ONCE
Qty: 0 | Refills: 0 | Status: COMPLETED | OUTPATIENT
Start: 2019-04-13 | End: 2019-04-13

## 2019-04-13 RX ORDER — HEPARIN SODIUM 5000 [USP'U]/ML
5000 INJECTION INTRAVENOUS; SUBCUTANEOUS EVERY 8 HOURS
Qty: 0 | Refills: 0 | Status: DISCONTINUED | OUTPATIENT
Start: 2019-04-13 | End: 2019-04-16

## 2019-04-13 RX ORDER — HYDROMORPHONE HYDROCHLORIDE 2 MG/ML
0.5 INJECTION INTRAMUSCULAR; INTRAVENOUS; SUBCUTANEOUS ONCE
Qty: 0 | Refills: 0 | Status: DISCONTINUED | OUTPATIENT
Start: 2019-04-13 | End: 2019-04-13

## 2019-04-13 RX ORDER — INSULIN LISPRO 100/ML
VIAL (ML) SUBCUTANEOUS
Qty: 0 | Refills: 0 | Status: DISCONTINUED | OUTPATIENT
Start: 2019-04-13 | End: 2019-04-16

## 2019-04-13 RX ORDER — INSULIN LISPRO 100/ML
VIAL (ML) SUBCUTANEOUS AT BEDTIME
Qty: 0 | Refills: 0 | Status: DISCONTINUED | OUTPATIENT
Start: 2019-04-13 | End: 2019-04-16

## 2019-04-13 RX ORDER — FUROSEMIDE 40 MG
20 TABLET ORAL ONCE
Qty: 0 | Refills: 0 | Status: COMPLETED | OUTPATIENT
Start: 2019-04-13 | End: 2019-04-13

## 2019-04-13 RX ORDER — ATORVASTATIN CALCIUM 80 MG/1
40 TABLET, FILM COATED ORAL AT BEDTIME
Qty: 0 | Refills: 0 | Status: DISCONTINUED | OUTPATIENT
Start: 2019-04-13 | End: 2019-04-16

## 2019-04-13 RX ORDER — POTASSIUM CHLORIDE 20 MEQ
10 PACKET (EA) ORAL
Qty: 0 | Refills: 0 | Status: COMPLETED | OUTPATIENT
Start: 2019-04-13 | End: 2019-04-13

## 2019-04-13 RX ORDER — ALBUMIN HUMAN 25 %
250 VIAL (ML) INTRAVENOUS ONCE
Qty: 0 | Refills: 0 | Status: COMPLETED | OUTPATIENT
Start: 2019-04-13 | End: 2019-04-13

## 2019-04-13 RX ORDER — LEVOTHYROXINE SODIUM 125 MCG
12.5 TABLET ORAL DAILY
Qty: 0 | Refills: 0 | Status: DISCONTINUED | OUTPATIENT
Start: 2019-04-13 | End: 2019-04-16

## 2019-04-13 RX ORDER — ASPIRIN/CALCIUM CARB/MAGNESIUM 324 MG
300 TABLET ORAL ONCE
Qty: 0 | Refills: 0 | Status: COMPLETED | OUTPATIENT
Start: 2019-04-13 | End: 2019-04-13

## 2019-04-13 RX ADMIN — Medication 100 MILLIGRAM(S): at 05:16

## 2019-04-13 RX ADMIN — Medication 100 MILLIEQUIVALENT(S): at 08:18

## 2019-04-13 RX ADMIN — Medication 100 MILLIEQUIVALENT(S): at 07:00

## 2019-04-13 RX ADMIN — Medication 300 MILLIGRAM(S): at 02:27

## 2019-04-13 RX ADMIN — Medication 10 MILLIGRAM(S): at 05:16

## 2019-04-13 RX ADMIN — Medication 400 MILLIGRAM(S): at 12:07

## 2019-04-13 RX ADMIN — Medication 100 MILLIEQUIVALENT(S): at 06:30

## 2019-04-13 RX ADMIN — HYDROMORPHONE HYDROCHLORIDE 0.5 MILLIGRAM(S): 2 INJECTION INTRAMUSCULAR; INTRAVENOUS; SUBCUTANEOUS at 21:53

## 2019-04-13 RX ADMIN — SODIUM CHLORIDE 10 MILLILITER(S): 9 INJECTION INTRAMUSCULAR; INTRAVENOUS; SUBCUTANEOUS at 07:00

## 2019-04-13 RX ADMIN — Medication 100 MILLIGRAM(S): at 14:42

## 2019-04-13 RX ADMIN — POLYETHYLENE GLYCOL 3350 17 GRAM(S): 17 POWDER, FOR SOLUTION ORAL at 12:07

## 2019-04-13 RX ADMIN — CHLORHEXIDINE GLUCONATE 5 MILLILITER(S): 213 SOLUTION TOPICAL at 11:57

## 2019-04-13 RX ADMIN — Medication 81 MILLIGRAM(S): at 11:57

## 2019-04-13 RX ADMIN — INSULIN HUMAN 3 UNIT(S)/HR: 100 INJECTION, SOLUTION SUBCUTANEOUS at 07:00

## 2019-04-13 RX ADMIN — Medication 125 MILLILITER(S): at 07:08

## 2019-04-13 RX ADMIN — FAMOTIDINE 20 MILLIGRAM(S): 10 INJECTION INTRAVENOUS at 17:39

## 2019-04-13 RX ADMIN — Medication 10 MILLIGRAM(S): at 21:24

## 2019-04-13 RX ADMIN — INSULIN HUMAN 3 UNIT(S)/HR: 100 INJECTION, SOLUTION SUBCUTANEOUS at 05:16

## 2019-04-13 RX ADMIN — Medication 100 MILLIGRAM(S): at 21:24

## 2019-04-13 RX ADMIN — Medication 10 MILLIGRAM(S): at 14:24

## 2019-04-13 RX ADMIN — HEPARIN SODIUM 5000 UNIT(S): 5000 INJECTION INTRAVENOUS; SUBCUTANEOUS at 14:23

## 2019-04-13 RX ADMIN — SODIUM CHLORIDE 10 MILLILITER(S): 9 INJECTION INTRAMUSCULAR; INTRAVENOUS; SUBCUTANEOUS at 05:17

## 2019-04-13 RX ADMIN — HEPARIN SODIUM 5000 UNIT(S): 5000 INJECTION INTRAVENOUS; SUBCUTANEOUS at 21:23

## 2019-04-13 RX ADMIN — Medication 1000 MILLIGRAM(S): at 12:22

## 2019-04-13 RX ADMIN — HYDROMORPHONE HYDROCHLORIDE 0.5 MILLIGRAM(S): 2 INJECTION INTRAMUSCULAR; INTRAVENOUS; SUBCUTANEOUS at 22:05

## 2019-04-13 RX ADMIN — Medication 25 MILLIGRAM(S): at 14:24

## 2019-04-13 RX ADMIN — CHLORHEXIDINE GLUCONATE 5 MILLILITER(S): 213 SOLUTION TOPICAL at 14:30

## 2019-04-13 RX ADMIN — Medication 20 MILLIGRAM(S): at 14:42

## 2019-04-13 RX ADMIN — Medication 100 MILLIGRAM(S): at 14:24

## 2019-04-13 RX ADMIN — SODIUM CHLORIDE 1000 MILLILITER(S): 9 INJECTION, SOLUTION INTRAVENOUS at 02:27

## 2019-04-13 RX ADMIN — CHLORHEXIDINE GLUCONATE 5 MILLILITER(S): 213 SOLUTION TOPICAL at 02:27

## 2019-04-13 RX ADMIN — Medication 2: at 17:41

## 2019-04-13 RX ADMIN — FAMOTIDINE 20 MILLIGRAM(S): 10 INJECTION INTRAVENOUS at 05:16

## 2019-04-13 RX ADMIN — ATORVASTATIN CALCIUM 40 MILLIGRAM(S): 80 TABLET, FILM COATED ORAL at 21:24

## 2019-04-13 RX ADMIN — CHLORHEXIDINE GLUCONATE 5 MILLILITER(S): 213 SOLUTION TOPICAL at 05:16

## 2019-04-13 NOTE — PHYSICAL THERAPY INITIAL EVALUATION ADULT - DISCHARGE DISPOSITION, PT EVAL
home w/ home PT/Home with home PT for gait, balance, & strength training and to return pt to baseline functional mobility status. AD needs to be assessed. Assist with mobility skills at this time (pt states her niece will be assisting her at home upon d/c).

## 2019-04-13 NOTE — PHYSICAL THERAPY INITIAL EVALUATION ADULT - PRECAUTIONS/LIMITATIONS, REHAB EVAL
surgical precautions/fall precautions surgical precautions/fall precautions/cardiac precautions/sternal precautions

## 2019-04-13 NOTE — AIRWAY REMOVAL NOTE  ADULT & PEDS - ARTIFICAL AIRWAY REMOVAL COMMENTS
Written order for extubation verified. Pt was identified by full name and birthday compared to ID band.  Present during the procedure was Abida ROMAN

## 2019-04-13 NOTE — PHYSICAL THERAPY INITIAL EVALUATION ADULT - ADDITIONAL COMMENTS
Patient resides with her daughter and son in law in home with 11 steps to enter and remains independent with ADLs and ambulation without AD Patient resides with her daughter and son in law in home with 11 steps to enter and remains independent with ADLs and ambulation without AD. Pt reports that her niece will be staying with her to assist upon d/c home.

## 2019-04-13 NOTE — PHYSICAL THERAPY INITIAL EVALUATION ADULT - DIAGNOSIS, PT EVAL
Decr. functional mobility 2/2 decr. dynamic balance, endurance; general deconditioning s/p cardiac surgery

## 2019-04-13 NOTE — PHYSICAL THERAPY INITIAL EVALUATION ADULT - PLANNED THERAPY INTERVENTIONS, PT EVAL
balance training/GOAL: Pt will negotiate 11 stairs with 1 HR and step to pattern with supervision in 2 weeks./transfer training/bed mobility training/gait training

## 2019-04-13 NOTE — PROGRESS NOTE ADULT - SUBJECTIVE AND OBJECTIVE BOX
CRITICAL CARE ATTENDING - CTICU    MEDICATIONS  (STANDING):  aspirin enteric coated 81 milliGRAM(s) Oral daily  atorvastatin 40 milliGRAM(s) Oral at bedtime  cefuroxime  IVPB 1500 milliGRAM(s) IV Intermittent every 8 hours  chlorhexidine 0.12% Liquid 5 milliLiter(s) Oral Mucosa every 4 hours  dexmedetomidine Infusion 0.3 MICROgram(s)/kG/Hr (5.565 mL/Hr) IV Continuous <Continuous>  dextrose 5%. 1000 milliLiter(s) (15 mL/Hr) IV Continuous <Continuous>  dextrose 50% Injectable 50 milliLiter(s) IV Push every 15 minutes  dextrose 50% Injectable 25 milliLiter(s) IV Push every 15 minutes  docusate sodium 100 milliGRAM(s) Oral three times a day  famotidine Injectable 20 milliGRAM(s) IV Push two times a day  heparin  Injectable 5000 Unit(s) SubCutaneous every 8 hours  insulin Infusion 3 Unit(s)/Hr (3 mL/Hr) IV Continuous <Continuous>  meperidine     Injectable 25 milliGRAM(s) IV Push once  metoclopramide Injectable 10 milliGRAM(s) IV Push every 8 hours  polyethylene glycol 3350 17 Gram(s) Oral daily  potassium chloride  10 mEq/50 mL IVPB 10 milliEquivalent(s) IV Intermittent every 1 hour  potassium chloride  10 mEq/50 mL IVPB 10 milliEquivalent(s) IV Intermittent every 1 hour  potassium chloride  10 mEq/50 mL IVPB 10 milliEquivalent(s) IV Intermittent every 1 hour  sodium chloride 0.9%. 1000 milliLiter(s) (10 mL/Hr) IV Continuous <Continuous>                                    10.6   10.8  )-----------( 164      ( 2019 02:21 )             30.8       04-13    139  |  104  |  11  ----------------------------<  127<H>  4.5   |  22  |  0.57    Ca    8.1<L>      2019 02:21  Phos  3.0     -13  Mg     2.1         TPro  5.4<L>  /  Alb  3.5  /  TBili  0.5  /  DBili  x   /  AST  81<H>  /  ALT  45  /  AlkPhos  38<L>  13      PT/INR - ( 2019 02:21 )   PT: 13.1 sec;   INR: 1.13 ratio         PTT - ( 2019 02:21 )  PTT:32.9 sec    Mode: AC/ CMV (Assist Control/ Continuous Mandatory Ventilation)  RR (machine): 10  TV (machine): 500  FiO2: 40  PEEP: 5      Daily Height in cm: 157.48 (2019 12:45)    Daily Weight in k.2 (2019 00:00)       @ 07: @ 07:00  --------------------------------------------------------  IN: 1765.6 mL / OUT: 2028 mL / NET: -262.4 mL     @ 07:01   @ 10:04  --------------------------------------------------------  IN: 20 mL / OUT: 120 mL / NET: -100 mL        Critically Ill patient  : [ ] preoperative ,   [ x] post operative    Requires :  [x ] Arterial Line   [x ] Central Line  [ ] PA catheter  [ ] IABP  [ ] ECMO  [ ] LVAD  [x ] Ventilator  [x ] pacemaker [ ] Impella.    Bedside evaluation , monitoring , treatment of hemodynamics , fluids , IVP/ IVCD meds.        Diagnosis:     POD 1 - CABG X 3 L    Admitted NSTEMI    Remains sedated /  post op anesthesia effects    f/u neuro exams    CT Head if remains obtunded    Requires chest PT, pulmonary toilet, ambu bagging, suctioning to maintain SaO2,  patent airway and treat atelectasis.     Ventilator Management:  [ x]AC-rest    [x ]CPAP-PS Wean   this AM  [ ]Trach Collar     [ ]Extubate    [x ] T-Piece  [ ]peep>5     Temporary pacemaker (TPM) interrogation and setting.     Requires  [ x]DDD  [ ]VVI    [ ]AII  temporary pacing at 80 min   to maintain HR, MAP, CI, and perfusion.     CT Drainage    ECG    Bradycardia    IVCD Insulin - DM 2    Hypotension    Hypovolemia      Hemodynamic lability,instability. Requires IVCD [ ] vasopressors [ ] inotropes  [ ] vasodilator  [x ]IVSS fluid  to maintain MAP, perfusion, C.I.                         Discussed with CT surgeon, Physician's Assistant - Nurse Practitioner- Critical care medicine team.   Dicussed at  AM / PM rounds.   Chart, labs , films reviewed.    Total Time: 30 min

## 2019-04-13 NOTE — PHYSICAL THERAPY INITIAL EVALUATION ADULT - PERTINENT HX OF CURRENT PROBLEM, REHAB EVAL
83 F PMH T2DM, HLD, Vit D deficiency, p/w one episode of acute onset chest pain today, f/w NSTEMI, pending [facility]. 83 F PMH T2DM, HLD, Vit D deficiency, p/w one episode of acute onset chest pain today, f/w NSTEMI, pending [facility]. Pt s/p CABG x3 on 4/12. Pt extubated on 4/13.

## 2019-04-13 NOTE — PHYSICAL THERAPY INITIAL EVALUATION ADULT - GENERAL OBSERVATIONS, REHAB EVAL
Pt received seated in chair, (+) R IJ, 2L NCO2, chest tube, external pacer, KOKO drain L LE, CTU monitoring. Pt alert, agreeable to PT eval.

## 2019-04-14 LAB
ALBUMIN SERPL ELPH-MCNC: 3.3 G/DL — SIGNIFICANT CHANGE UP (ref 3.3–5)
ALP SERPL-CCNC: 37 U/L — LOW (ref 40–120)
ALT FLD-CCNC: 41 U/L — SIGNIFICANT CHANGE UP (ref 10–45)
ANION GAP SERPL CALC-SCNC: 13 MMOL/L — SIGNIFICANT CHANGE UP (ref 5–17)
APTT BLD: 36.3 SEC — SIGNIFICANT CHANGE UP (ref 27.5–36.3)
AST SERPL-CCNC: 69 U/L — HIGH (ref 10–40)
BILIRUB SERPL-MCNC: 0.5 MG/DL — SIGNIFICANT CHANGE UP (ref 0.2–1.2)
BUN SERPL-MCNC: 12 MG/DL — SIGNIFICANT CHANGE UP (ref 7–23)
CALCIUM SERPL-MCNC: 8.2 MG/DL — LOW (ref 8.4–10.5)
CHLORIDE SERPL-SCNC: 104 MMOL/L — SIGNIFICANT CHANGE UP (ref 96–108)
CO2 SERPL-SCNC: 20 MMOL/L — LOW (ref 22–31)
CREAT SERPL-MCNC: 0.66 MG/DL — SIGNIFICANT CHANGE UP (ref 0.5–1.3)
GAS PNL BLDA: SIGNIFICANT CHANGE UP
GAS PNL BLDA: SIGNIFICANT CHANGE UP
GLUCOSE BLDC GLUCOMTR-MCNC: 139 MG/DL — HIGH (ref 70–99)
GLUCOSE BLDC GLUCOMTR-MCNC: 193 MG/DL — HIGH (ref 70–99)
GLUCOSE BLDC GLUCOMTR-MCNC: 207 MG/DL — HIGH (ref 70–99)
GLUCOSE BLDC GLUCOMTR-MCNC: 247 MG/DL — HIGH (ref 70–99)
GLUCOSE SERPL-MCNC: 173 MG/DL — HIGH (ref 70–99)
HCT VFR BLD CALC: 26.4 % — LOW (ref 34.5–45)
HGB BLD-MCNC: 9.2 G/DL — LOW (ref 11.5–15.5)
INR BLD: 1.25 RATIO — HIGH (ref 0.88–1.16)
MAGNESIUM SERPL-MCNC: 2.1 MG/DL — SIGNIFICANT CHANGE UP (ref 1.6–2.6)
MCHC RBC-ENTMCNC: 30.9 PG — SIGNIFICANT CHANGE UP (ref 27–34)
MCHC RBC-ENTMCNC: 34.7 GM/DL — SIGNIFICANT CHANGE UP (ref 32–36)
MCV RBC AUTO: 89 FL — SIGNIFICANT CHANGE UP (ref 80–100)
PHOSPHATE SERPL-MCNC: 2.3 MG/DL — LOW (ref 2.5–4.5)
PLATELET # BLD AUTO: 164 K/UL — SIGNIFICANT CHANGE UP (ref 150–400)
POTASSIUM SERPL-MCNC: 4.5 MMOL/L — SIGNIFICANT CHANGE UP (ref 3.5–5.3)
POTASSIUM SERPL-SCNC: 4.5 MMOL/L — SIGNIFICANT CHANGE UP (ref 3.5–5.3)
PROT SERPL-MCNC: 5.7 G/DL — LOW (ref 6–8.3)
PROTHROM AB SERPL-ACNC: 14.3 SEC — HIGH (ref 10–12.9)
RBC # BLD: 2.97 M/UL — LOW (ref 3.8–5.2)
RBC # FLD: 12.7 % — SIGNIFICANT CHANGE UP (ref 10.3–14.5)
SODIUM SERPL-SCNC: 137 MMOL/L — SIGNIFICANT CHANGE UP (ref 135–145)
WBC # BLD: 9.5 K/UL — SIGNIFICANT CHANGE UP (ref 3.8–10.5)
WBC # FLD AUTO: 9.5 K/UL — SIGNIFICANT CHANGE UP (ref 3.8–10.5)

## 2019-04-14 PROCEDURE — 99291 CRITICAL CARE FIRST HOUR: CPT

## 2019-04-14 PROCEDURE — 93010 ELECTROCARDIOGRAM REPORT: CPT

## 2019-04-14 PROCEDURE — 71045 X-RAY EXAM CHEST 1 VIEW: CPT | Mod: 26

## 2019-04-14 RX ORDER — FAMOTIDINE 10 MG/ML
20 INJECTION INTRAVENOUS
Qty: 0 | Refills: 0 | Status: DISCONTINUED | OUTPATIENT
Start: 2019-04-14 | End: 2019-04-16

## 2019-04-14 RX ORDER — METOPROLOL TARTRATE 50 MG
25 TABLET ORAL EVERY 8 HOURS
Qty: 0 | Refills: 0 | Status: DISCONTINUED | OUTPATIENT
Start: 2019-04-14 | End: 2019-04-15

## 2019-04-14 RX ORDER — ACETAMINOPHEN 500 MG
1000 TABLET ORAL ONCE
Qty: 0 | Refills: 0 | Status: COMPLETED | OUTPATIENT
Start: 2019-04-14 | End: 2019-04-14

## 2019-04-14 RX ORDER — SODIUM CHLORIDE 9 MG/ML
3 INJECTION INTRAMUSCULAR; INTRAVENOUS; SUBCUTANEOUS EVERY 8 HOURS
Qty: 0 | Refills: 0 | Status: DISCONTINUED | OUTPATIENT
Start: 2019-04-14 | End: 2019-04-16

## 2019-04-14 RX ORDER — METFORMIN HYDROCHLORIDE 850 MG/1
1000 TABLET ORAL DAILY
Qty: 0 | Refills: 0 | Status: DISCONTINUED | OUTPATIENT
Start: 2019-04-14 | End: 2019-04-16

## 2019-04-14 RX ADMIN — Medication 8: at 08:21

## 2019-04-14 RX ADMIN — SODIUM CHLORIDE 3 MILLILITER(S): 9 INJECTION INTRAMUSCULAR; INTRAVENOUS; SUBCUTANEOUS at 21:22

## 2019-04-14 RX ADMIN — SODIUM CHLORIDE 3 MILLILITER(S): 9 INJECTION INTRAMUSCULAR; INTRAVENOUS; SUBCUTANEOUS at 13:05

## 2019-04-14 RX ADMIN — METFORMIN HYDROCHLORIDE 1000 MILLIGRAM(S): 850 TABLET ORAL at 12:15

## 2019-04-14 RX ADMIN — Medication 25 MILLIGRAM(S): at 21:10

## 2019-04-14 RX ADMIN — HEPARIN SODIUM 5000 UNIT(S): 5000 INJECTION INTRAVENOUS; SUBCUTANEOUS at 13:28

## 2019-04-14 RX ADMIN — Medication 81 MILLIGRAM(S): at 12:15

## 2019-04-14 RX ADMIN — Medication 10 MILLIGRAM(S): at 05:44

## 2019-04-14 RX ADMIN — Medication 25 MILLIGRAM(S): at 13:28

## 2019-04-14 RX ADMIN — HEPARIN SODIUM 5000 UNIT(S): 5000 INJECTION INTRAVENOUS; SUBCUTANEOUS at 05:44

## 2019-04-14 RX ADMIN — Medication 2: at 17:06

## 2019-04-14 RX ADMIN — Medication 25 MILLIGRAM(S): at 01:49

## 2019-04-14 RX ADMIN — Medication 100 MILLIGRAM(S): at 13:28

## 2019-04-14 RX ADMIN — Medication 5: at 12:15

## 2019-04-14 RX ADMIN — Medication 100 MILLIGRAM(S): at 21:10

## 2019-04-14 RX ADMIN — SODIUM CHLORIDE 10 MILLILITER(S): 9 INJECTION INTRAMUSCULAR; INTRAVENOUS; SUBCUTANEOUS at 07:15

## 2019-04-14 RX ADMIN — HEPARIN SODIUM 5000 UNIT(S): 5000 INJECTION INTRAVENOUS; SUBCUTANEOUS at 21:10

## 2019-04-14 RX ADMIN — POLYETHYLENE GLYCOL 3350 17 GRAM(S): 17 POWDER, FOR SOLUTION ORAL at 12:14

## 2019-04-14 RX ADMIN — CHLORHEXIDINE GLUCONATE 5 MILLILITER(S): 213 SOLUTION TOPICAL at 17:48

## 2019-04-14 RX ADMIN — Medication 62.5 MILLIMOLE(S): at 03:48

## 2019-04-14 RX ADMIN — Medication 100 MILLIGRAM(S): at 05:45

## 2019-04-14 RX ADMIN — Medication 1000 MILLIGRAM(S): at 09:20

## 2019-04-14 RX ADMIN — FAMOTIDINE 20 MILLIGRAM(S): 10 INJECTION INTRAVENOUS at 05:44

## 2019-04-14 RX ADMIN — Medication 10 MILLIGRAM(S): at 13:28

## 2019-04-14 RX ADMIN — Medication 400 MILLIGRAM(S): at 08:50

## 2019-04-14 RX ADMIN — ATORVASTATIN CALCIUM 40 MILLIGRAM(S): 80 TABLET, FILM COATED ORAL at 21:10

## 2019-04-14 RX ADMIN — Medication 100 MILLIGRAM(S): at 05:44

## 2019-04-14 RX ADMIN — Medication 25 MILLIGRAM(S): at 05:44

## 2019-04-14 RX ADMIN — FAMOTIDINE 20 MILLIGRAM(S): 10 INJECTION INTRAVENOUS at 17:48

## 2019-04-14 RX ADMIN — Medication 12.5 MICROGRAM(S): at 05:44

## 2019-04-14 NOTE — PROGRESS NOTE ADULT - ASSESSMENT
84 yo female  sp  C3L   H  HTN  DM  obese  NSTEMI  post op  no issues  4/14  trf too floor   NSR  on BB, PW to EPM

## 2019-04-14 NOTE — PROGRESS NOTE ADULT - SUBJECTIVE AND OBJECTIVE BOX
CRITICAL CARE ATTENDING - CTICU    MEDICATIONS  (STANDING):  aspirin enteric coated 81 milliGRAM(s) Oral daily  atorvastatin 40 milliGRAM(s) Oral at bedtime  cefuroxime  IVPB 1500 milliGRAM(s) IV Intermittent every 8 hours  chlorhexidine 0.12% Liquid 5 milliLiter(s) Oral Mucosa every 4 hours  docusate sodium 100 milliGRAM(s) Oral three times a day  famotidine Injectable 20 milliGRAM(s) IV Push two times a day  heparin  Injectable 5000 Unit(s) SubCutaneous every 8 hours  insulin Infusion 3 Unit(s)/Hr (3 mL/Hr) IV Continuous <Continuous>  meperidine     Injectable 25 milliGRAM(s) IV Push once  metoclopramide Injectable 10 milliGRAM(s) IV Push every 8 hours  polyethylene glycol 3350 17 Gram(s) Oral daily                          9.2    9.5   )-----------( 164      ( 14 Apr 2019 01:22 )             26.4   04-14    137  |  104  |  12  ----------------------------<  173<H>  4.5   |  20<L>  |  0.66    Ca    8.2<L>      14 Apr 2019 01:23  Phos  2.3     04-14  Mg     2.1     04-14    TPro  5.7<L>  /  Alb  3.3  /  TBili  0.5  /  DBili  x   /  AST  69<H>  /  ALT  41  /  AlkPhos  37<L>  04-14      I&O's Detail    13 Apr 2019 07:01  -  14 Apr 2019 07:00  --------------------------------------------------------  IN:    insulin Infusion: 11 mL    IV PiggyBack: 400 mL    Oral Fluid: 240 mL    sodium chloride 0.9%.: 240 mL  Total IN: 891 mL    OUT:    Chest Tube: 80 mL    Indwelling Catheter - Urethral: 2020 mL  Total OUT: 2100 mL    Total NET: -1209 mL      14 Apr 2019 07:01  -  14 Apr 2019 08:52  --------------------------------------------------------  IN:    IV PiggyBack: 100 mL    Oral Fluid: 100 mL    sodium chloride 0.9%.: 20 mL  Total IN: 220 mL    OUT:    Chest Tube: 50 mL    Indwelling Catheter - Urethral: 55 mL  Total OUT: 105 mL    Total NET: 115 mL        Critically Ill patient  : [ ] preoperative ,   [ x] post operative    Requires :  [x ] Arterial Line   [x ] Central Line  [ ] PA catheter  [ ] IABP  [ ] ECMO  [ ] LVAD  [x ] Ventilator  [x ] pacemaker [ ] Impella.    Bedside evaluation , monitoring , treatment of hemodynamics , fluids , IVP/ IVCD meds.        Diagnosis:     POD 1 - CABG X 3 L    Admitted NSTEMI    Remains sedated /  post op anesthesia effects    f/u neuro exams    CT Head if remains obtunded    Requires chest PT, pulmonary toilet, ambu bagging, suctioning to maintain SaO2,  patent airway and treat atelectasis.     Ventilator Management:  [ x]AC-rest    [x ]CPAP-PS Wean   this AM  [ ]Trach Collar     [ ]Extubate    [x ] T-Piece  [ ]peep>5     Temporary pacemaker (TPM) interrogation and setting.     Requires  [ x]DDD  [ ]VVI    [ ]AII  temporary pacing at 80 min   to maintain HR, MAP, CI, and perfusion.     CT Drainage    ECG    Bradycardia    IVCD Insulin - DM 2    Hypotension    Hypovolemia    Hemodynamic lability,instability. Requires IVCD [ ] vasopressors [ ] inotropes  [ ] vasodilator  [x ]IVSS fluid  to maintain MAP, perfusion, C.I.       Discussed with CT surgeon, Physician's Assistant - Nurse Practitioner- Critical care medicine team.   Dicussed at  AM / PM rounds.   Chart, labs , films reviewed.    Total Time: 30 min

## 2019-04-14 NOTE — PROGRESS NOTE ADULT - SUBJECTIVE AND OBJECTIVE BOX
VITAL SIGNS    Telemetry:    SR  70-80  Vital Signs Last 24 Hrs  T(C): 36.6 (04-14-19 @ 13:57), Max: 38.2 (04-14-19 @ 04:00)  T(F): 97.9 (04-14-19 @ 13:57), Max: 100.8 (04-14-19 @ 04:00)  HR: 86 (04-14-19 @ 13:57) (69 - 90)  BP: 105/70 (04-14-19 @ 13:57) (102/54 - 125/64)  RR: 18 (04-14-19 @ 13:57) (18 - 32)  SpO2: 97% (04-14-19 @ 13:57) (97% - 100%)              Daily       Bilirubin Total, Serum: 0.5 mg/dL (04-14 @ 01:23)    CAPILLARY BLOOD GLUCOSE  126 (13 Apr 2019 17:00)      POCT Blood Glucose.: 139 mg/dL (14 Apr 2019 16:30)  POCT Blood Glucose.: 193 mg/dL (14 Apr 2019 12:00)  POCT Blood Glucose.: 247 mg/dL (14 Apr 2019 07:52)  POCT Blood Glucose.: 126 mg/dL (13 Apr 2019 17:24)          Pacing Wires         Settings:                                              PHYSICAL EXAM    Neurology: alert and oriented x 3, moves all extremities with no defecits  CV :  RRR  Sternal Wound :  CDI , Stable+ pw  to epm  Lungs:   CTA B/L  Abdomen: soft, nontender, nondistended, positive bowel sound  Extremities:       trace  le edema

## 2019-04-15 DIAGNOSIS — I10 ESSENTIAL (PRIMARY) HYPERTENSION: ICD-10-CM

## 2019-04-15 DIAGNOSIS — Z95.1 PRESENCE OF AORTOCORONARY BYPASS GRAFT: ICD-10-CM

## 2019-04-15 LAB
ALBUMIN SERPL ELPH-MCNC: 3.6 G/DL — SIGNIFICANT CHANGE UP (ref 3.3–5)
ALP SERPL-CCNC: 46 U/L — SIGNIFICANT CHANGE UP (ref 40–120)
ALT FLD-CCNC: 34 U/L — SIGNIFICANT CHANGE UP (ref 10–45)
ANION GAP SERPL CALC-SCNC: 9 MMOL/L — SIGNIFICANT CHANGE UP (ref 5–17)
AST SERPL-CCNC: 63 U/L — HIGH (ref 10–40)
BASOPHILS # BLD AUTO: 0 K/UL — SIGNIFICANT CHANGE UP (ref 0–0.2)
BASOPHILS NFR BLD AUTO: 0.3 % — SIGNIFICANT CHANGE UP (ref 0–2)
BILIRUB SERPL-MCNC: 0.6 MG/DL — SIGNIFICANT CHANGE UP (ref 0.2–1.2)
BUN SERPL-MCNC: 14 MG/DL — SIGNIFICANT CHANGE UP (ref 7–23)
CALCIUM SERPL-MCNC: 8.9 MG/DL — SIGNIFICANT CHANGE UP (ref 8.4–10.5)
CHLORIDE SERPL-SCNC: 101 MMOL/L — SIGNIFICANT CHANGE UP (ref 96–108)
CO2 SERPL-SCNC: 24 MMOL/L — SIGNIFICANT CHANGE UP (ref 22–31)
CREAT SERPL-MCNC: 0.75 MG/DL — SIGNIFICANT CHANGE UP (ref 0.5–1.3)
EOSINOPHIL # BLD AUTO: 0 K/UL — SIGNIFICANT CHANGE UP (ref 0–0.5)
EOSINOPHIL NFR BLD AUTO: 0.4 % — SIGNIFICANT CHANGE UP (ref 0–6)
GLUCOSE BLDC GLUCOMTR-MCNC: 114 MG/DL — HIGH (ref 70–99)
GLUCOSE BLDC GLUCOMTR-MCNC: 156 MG/DL — HIGH (ref 70–99)
GLUCOSE BLDC GLUCOMTR-MCNC: 178 MG/DL — HIGH (ref 70–99)
GLUCOSE BLDC GLUCOMTR-MCNC: 204 MG/DL — HIGH (ref 70–99)
GLUCOSE SERPL-MCNC: 187 MG/DL — HIGH (ref 70–99)
HCT VFR BLD CALC: 26.7 % — LOW (ref 34.5–45)
HGB BLD-MCNC: 9 G/DL — LOW (ref 11.5–15.5)
LYMPHOCYTES # BLD AUTO: 1.8 K/UL — SIGNIFICANT CHANGE UP (ref 1–3.3)
LYMPHOCYTES # BLD AUTO: 18.8 % — SIGNIFICANT CHANGE UP (ref 13–44)
MCHC RBC-ENTMCNC: 30.4 PG — SIGNIFICANT CHANGE UP (ref 27–34)
MCHC RBC-ENTMCNC: 33.7 GM/DL — SIGNIFICANT CHANGE UP (ref 32–36)
MCV RBC AUTO: 90.2 FL — SIGNIFICANT CHANGE UP (ref 80–100)
MONOCYTES # BLD AUTO: 0.9 K/UL — SIGNIFICANT CHANGE UP (ref 0–0.9)
MONOCYTES NFR BLD AUTO: 9.5 % — SIGNIFICANT CHANGE UP (ref 2–14)
NEUTROPHILS # BLD AUTO: 6.9 K/UL — SIGNIFICANT CHANGE UP (ref 1.8–7.4)
NEUTROPHILS NFR BLD AUTO: 71 % — SIGNIFICANT CHANGE UP (ref 43–77)
PLATELET # BLD AUTO: 178 K/UL — SIGNIFICANT CHANGE UP (ref 150–400)
POTASSIUM SERPL-MCNC: 4.8 MMOL/L — SIGNIFICANT CHANGE UP (ref 3.5–5.3)
POTASSIUM SERPL-SCNC: 4.8 MMOL/L — SIGNIFICANT CHANGE UP (ref 3.5–5.3)
PROT SERPL-MCNC: 6.3 G/DL — SIGNIFICANT CHANGE UP (ref 6–8.3)
RBC # BLD: 2.96 M/UL — LOW (ref 3.8–5.2)
RBC # FLD: 12.5 % — SIGNIFICANT CHANGE UP (ref 10.3–14.5)
SODIUM SERPL-SCNC: 134 MMOL/L — LOW (ref 135–145)
WBC # BLD: 9.7 K/UL — SIGNIFICANT CHANGE UP (ref 3.8–10.5)
WBC # FLD AUTO: 9.7 K/UL — SIGNIFICANT CHANGE UP (ref 3.8–10.5)

## 2019-04-15 PROCEDURE — 71045 X-RAY EXAM CHEST 1 VIEW: CPT | Mod: 26

## 2019-04-15 RX ORDER — METOPROLOL TARTRATE 50 MG
25 TABLET ORAL ONCE
Qty: 0 | Refills: 0 | Status: COMPLETED | OUTPATIENT
Start: 2019-04-15 | End: 2019-04-15

## 2019-04-15 RX ORDER — LISINOPRIL 2.5 MG/1
5 TABLET ORAL DAILY
Qty: 0 | Refills: 0 | Status: DISCONTINUED | OUTPATIENT
Start: 2019-04-15 | End: 2019-04-16

## 2019-04-15 RX ORDER — METOPROLOL TARTRATE 50 MG
50 TABLET ORAL
Qty: 0 | Refills: 0 | Status: DISCONTINUED | OUTPATIENT
Start: 2019-04-15 | End: 2019-04-16

## 2019-04-15 RX ADMIN — SODIUM CHLORIDE 3 MILLILITER(S): 9 INJECTION INTRAMUSCULAR; INTRAVENOUS; SUBCUTANEOUS at 05:20

## 2019-04-15 RX ADMIN — Medication 5: at 08:40

## 2019-04-15 RX ADMIN — Medication 100 MILLIGRAM(S): at 05:17

## 2019-04-15 RX ADMIN — Medication 12.5 MICROGRAM(S): at 05:17

## 2019-04-15 RX ADMIN — Medication 81 MILLIGRAM(S): at 11:35

## 2019-04-15 RX ADMIN — FAMOTIDINE 20 MILLIGRAM(S): 10 INJECTION INTRAVENOUS at 17:16

## 2019-04-15 RX ADMIN — ATORVASTATIN CALCIUM 40 MILLIGRAM(S): 80 TABLET, FILM COATED ORAL at 21:19

## 2019-04-15 RX ADMIN — Medication 25 MILLIGRAM(S): at 11:35

## 2019-04-15 RX ADMIN — HEPARIN SODIUM 5000 UNIT(S): 5000 INJECTION INTRAVENOUS; SUBCUTANEOUS at 13:09

## 2019-04-15 RX ADMIN — Medication 5: at 12:40

## 2019-04-15 RX ADMIN — LISINOPRIL 5 MILLIGRAM(S): 2.5 TABLET ORAL at 11:35

## 2019-04-15 RX ADMIN — HEPARIN SODIUM 5000 UNIT(S): 5000 INJECTION INTRAVENOUS; SUBCUTANEOUS at 21:19

## 2019-04-15 RX ADMIN — HEPARIN SODIUM 5000 UNIT(S): 5000 INJECTION INTRAVENOUS; SUBCUTANEOUS at 05:17

## 2019-04-15 RX ADMIN — METFORMIN HYDROCHLORIDE 1000 MILLIGRAM(S): 850 TABLET ORAL at 11:35

## 2019-04-15 RX ADMIN — POLYETHYLENE GLYCOL 3350 17 GRAM(S): 17 POWDER, FOR SOLUTION ORAL at 11:36

## 2019-04-15 RX ADMIN — Medication 50 MILLIGRAM(S): at 17:16

## 2019-04-15 RX ADMIN — Medication 25 MILLIGRAM(S): at 05:17

## 2019-04-15 RX ADMIN — FAMOTIDINE 20 MILLIGRAM(S): 10 INJECTION INTRAVENOUS at 05:17

## 2019-04-15 RX ADMIN — SODIUM CHLORIDE 3 MILLILITER(S): 9 INJECTION INTRAMUSCULAR; INTRAVENOUS; SUBCUTANEOUS at 21:20

## 2019-04-15 RX ADMIN — SODIUM CHLORIDE 3 MILLILITER(S): 9 INJECTION INTRAMUSCULAR; INTRAVENOUS; SUBCUTANEOUS at 13:09

## 2019-04-15 RX ADMIN — Medication 100 MILLIGRAM(S): at 11:35

## 2019-04-15 RX ADMIN — Medication 100 MILLIGRAM(S): at 21:19

## 2019-04-15 NOTE — PROGRESS NOTE ADULT - PROBLEM SELECTOR PROBLEM 2
Type 2 diabetes mellitus without complication, without long-term current use of insulin S/P CABG x 3

## 2019-04-15 NOTE — PROGRESS NOTE ADULT - SUBJECTIVE AND OBJECTIVE BOX
VITAL SIGNS    Telemetry:    Vital Signs Last 24 Hrs  T(C): 37.1 (04-15-19 @ 04:52), Max: 37.1 (04-15-19 @ 04:52)  T(F): 98.8 (04-15-19 @ 04:52), Max: 98.8 (04-15-19 @ 04:52)  HR: 87 (04-15-19 @ 04:52) (76 - 87)  BP: 176/75 (04-15-19 @ 04:52) (105/70 - 176/75)  RR: 16 (04-15-19 @ 04:52) (16 - 22)  SpO2: 100% (04-15-19 @ 04:52) (97% - 100%)             @ 07:01  -  04-15 @ 07:00  --------------------------------------------------------  IN: 360 mL / OUT: 580 mL / NET: -220 mL    04-15 @ 07:01  -  04-15 @ 11:34  --------------------------------------------------------  IN: 120 mL / OUT: 0 mL / NET: 120 mL       Daily     Daily Weight in k.1 (15 Apr 2019 08:24)  Admit Wt: Drug Dosing Weight  Height (cm): 157.48 (2019 12:45)  Weight (kg): 74.2 (2019 12:45)  BMI (kg/m2): 29.9 (2019 12:45)  BSA (m2): 1.75 (2019 12:45)    Bilirubin Total, Serum: 0.6 mg/dL (04-15 @ 06:17)    CAPILLARY BLOOD GLUCOSE      POCT Blood Glucose.: 178 mg/dL (15 Apr 2019 08:21)  POCT Blood Glucose.: 207 mg/dL (2019 21:19)  POCT Blood Glucose.: 139 mg/dL (2019 16:30)  POCT Blood Glucose.: 193 mg/dL (2019 12:00)          aspirin enteric coated 81 milliGRAM(s) Oral daily  atorvastatin 40 milliGRAM(s) Oral at bedtime  dextrose 50% Injectable 50 milliLiter(s) IV Push every 15 minutes  dextrose 50% Injectable 25 milliLiter(s) IV Push every 15 minutes  docusate sodium 100 milliGRAM(s) Oral three times a day  famotidine    Tablet 20 milliGRAM(s) Oral two times a day  heparin  Injectable 5000 Unit(s) SubCutaneous every 8 hours  insulin lispro (HumaLOG) corrective regimen sliding scale   SubCutaneous at bedtime  insulin lispro (HumaLOG) corrective regimen sliding scale   SubCutaneous three times a day before meals  levothyroxine 12.5 MICROGram(s) Oral daily  lisinopril 5 milliGRAM(s) Oral daily  metFORMIN 1000 milliGRAM(s) Oral daily  metoprolol tartrate 50 milliGRAM(s) Oral two times a day  metoprolol tartrate 25 milliGRAM(s) Oral once  oxyCODONE    5 mG/acetaminophen 325 mG 1 Tablet(s) Oral every 6 hours PRN  oxyCODONE    5 mG/acetaminophen 325 mG 2 Tablet(s) Oral every 6 hours PRN  polyethylene glycol 3350 17 Gram(s) Oral daily  sodium chloride 0.9% lock flush 3 milliLiter(s) IV Push every 8 hours      PHYSICAL EXAM    Subjective: "Hi.   Neurology: alert and oriented x 3, nonfocal, no gross deficits  CV : tele:  RSR  Sternal Wound :  CDI with dressing , Stable  Lungs: clear. RR easy, unlabored   Abdomen: soft, nontender, nondistended, positive bowel sounds, bowel movement   Neg N/V/D   :  pt voiding without difficulty   Extremities:   CORTES; edema, neg calf tenderness.   PPP bilaterally      PW:  Chest tubes: VITAL SIGNS    Telemetry:  rsr 70-90 with pvc's   Vital Signs Last 24 Hrs  T(C): 37.1 (04-15-19 @ 04:52), Max: 37.1 (04-15-19 @ 04:52)  T(F): 98.8 (04-15-19 @ 04:52), Max: 98.8 (04-15-19 @ 04:52)  HR: 87 (04-15-19 @ 04:52) (76 - 87)  BP: 176/75 (04-15-19 @ 04:52) (105/70 - 176/75)  RR: 16 (04-15-19 @ 04:52) (16 - 22)  SpO2: 100% (04-15-19 @ 04:52) (97% - 100%)             @ 07:01  -  04-15 @ 07:00  --------------------------------------------------------  IN: 360 mL / OUT: 580 mL / NET: -220 mL    04-15 @ 07:01  -  04-15 @ 11:34  --------------------------------------------------------  IN: 120 mL / OUT: 0 mL / NET: 120 mL       Daily     Daily Weight in k.1 (15 Apr 2019 08:24)  Admit Wt: Drug Dosing Weight  Height (cm): 157.48 (2019 12:45)  Weight (kg): 74.2 (2019 12:45)  BMI (kg/m2): 29.9 (2019 12:45)  BSA (m2): 1.75 (2019 12:45)    Bilirubin Total, Serum: 0.6 mg/dL (04-15 @ 06:17)    CAPILLARY BLOOD GLUCOSE      POCT Blood Glucose.: 178 mg/dL (15 Apr 2019 08:21)  POCT Blood Glucose.: 207 mg/dL (2019 21:19)  POCT Blood Glucose.: 139 mg/dL (2019 16:30)  POCT Blood Glucose.: 193 mg/dL (2019 12:00)          aspirin enteric coated 81 milliGRAM(s) Oral daily  atorvastatin 40 milliGRAM(s) Oral at bedtime  dextrose 50% Injectable 50 milliLiter(s) IV Push every 15 minutes  dextrose 50% Injectable 25 milliLiter(s) IV Push every 15 minutes  docusate sodium 100 milliGRAM(s) Oral three times a day  famotidine    Tablet 20 milliGRAM(s) Oral two times a day  heparin  Injectable 5000 Unit(s) SubCutaneous every 8 hours  insulin lispro (HumaLOG) corrective regimen sliding scale   SubCutaneous at bedtime  insulin lispro (HumaLOG) corrective regimen sliding scale   SubCutaneous three times a day before meals  levothyroxine 12.5 MICROGram(s) Oral daily  lisinopril 5 milliGRAM(s) Oral daily  metFORMIN 1000 milliGRAM(s) Oral daily  metoprolol tartrate 50 milliGRAM(s) Oral two times a day  metoprolol tartrate 25 milliGRAM(s) Oral once  oxyCODONE    5 mG/acetaminophen 325 mG 1 Tablet(s) Oral every 6 hours PRN  oxyCODONE    5 mG/acetaminophen 325 mG 2 Tablet(s) Oral every 6 hours PRN  polyethylene glycol 3350 17 Gram(s) Oral daily  sodium chloride 0.9% lock flush 3 milliLiter(s) IV Push every 8 hours      PHYSICAL EXAM    Subjective: "I want to go to rehab."  Neurology: alert and oriented x 3, nonfocal, no gross deficits  CV : tele:  RSR 70-90 with pvc's   Sternal Wound :  CDI HERBERT - pw isolated   Lungs: clear but diminished at the bases. RR easy, unlabored   Abdomen: soft, nontender, nondistended, positive bowel sounds, + bowel movement;  Neg N/V/D   :  pt voiding without difficulty   Extremities:   CORTES; trace LE edema, neg calf tenderness.   PPP bilaterally; left SVG site cdi herbert      PW: + isolated  Chest tubes: none

## 2019-04-15 NOTE — PROGRESS NOTE ADULT - NSHPATTENDINGPLANDISCUSS_GEN_ALL_CORE
cts rounds
To reach Cardiology Attending call during weekdays Spectra [phone] or Fellow [phone].
To reach Cardiology Attending call during weekdays Spectra [phone] or Fellow [phone].
cts rounds
NP
NP

## 2019-04-15 NOTE — PROGRESS NOTE ADULT - PROVIDER SPECIALTY LIST ADULT
CT Surgery
CTU
Cardiology
Cardiology
Critical Care
Hospitalist
Hospitalist
Critical Care
CT Surgery

## 2019-04-15 NOTE — PROGRESS NOTE ADULT - ASSESSMENT
84 yo female  sp  C3L   H  HTN  DM  obese  NSTEMI  post op  no issues  4/14  trf too floor   NSR  on BB, PW to EPM 83 F PMH T2DM, HLD, Vit D deficiency, p/w one episode of acute onset chest pain today. Mid sternal, non radiating, sharp, precipitated by exertion (pt was carrying heavy grocery bags) and relieved by rest. Never had similar sx before. Denies associated sob, palpitations, LE edema, orthopnea.  NO prior provocative testing, no prior aspirin use. Unk baseline ekg. +FH early CAD with MI (brother, mother both with MI in 50s). Lifelong nonsmoker, no etoh or other toxic habits. Pt presented to [facility] where EKG showed signs of inferolateral ischemia, and troponin was elevated. s/p aspirin load, plavix load, hep gtt, and transferred to [facility] for cards eval and cardiac cath. Currently chest pain free, resting comfortably.   s/p 4/12/19 C3L    POD # 3  postop course unremarkable  4/14 tx floor  5/15 VSS; pw isolated; +HTN - lisinipril initiated and bp improved this afteroon, bb increased to lopressor 50 mg po bid  Discharge planning- home PT recommended but patient wants rehab

## 2019-04-15 NOTE — PROGRESS NOTE ADULT - PROBLEM SELECTOR PLAN 1
Home plan  wednesday  pw  to [facility]  lop 25q12 diabetic diet  acccuechecks ac and hs  continue metformin  insulin sliding scale

## 2019-04-15 NOTE — PROGRESS NOTE ADULT - PROBLEM SELECTOR PLAN 3
no pharm vte ppx given hep gtt
no pharm vte ppx given hep gtt
lisinipril 5 po qd  vitals as per routine   increase lopressor 50 mg po bid

## 2019-04-15 NOTE — PROGRESS NOTE ADULT - PROBLEM SELECTOR PLAN 2
metformin dose from home continue postop care  continue asa/ statin/ b-blockers- increase 50 mg po bid   pw isolated  pain management  pulm toilet  increase activity as tolerated  Discharge planning- rehab tue

## 2019-04-16 ENCOUNTER — TRANSCRIPTION ENCOUNTER (OUTPATIENT)
Age: 84
End: 2019-04-16

## 2019-04-16 VITALS — WEIGHT: 147.71 LBS

## 2019-04-16 LAB
ANION GAP SERPL CALC-SCNC: 10 MMOL/L — SIGNIFICANT CHANGE UP (ref 5–17)
BUN SERPL-MCNC: 12 MG/DL — SIGNIFICANT CHANGE UP (ref 7–23)
CALCIUM SERPL-MCNC: 8.8 MG/DL — SIGNIFICANT CHANGE UP (ref 8.4–10.5)
CHLORIDE SERPL-SCNC: 102 MMOL/L — SIGNIFICANT CHANGE UP (ref 96–108)
CO2 SERPL-SCNC: 26 MMOL/L — SIGNIFICANT CHANGE UP (ref 22–31)
CREAT SERPL-MCNC: 0.68 MG/DL — SIGNIFICANT CHANGE UP (ref 0.5–1.3)
GLUCOSE BLDC GLUCOMTR-MCNC: 166 MG/DL — HIGH (ref 70–99)
GLUCOSE BLDC GLUCOMTR-MCNC: 200 MG/DL — HIGH (ref 70–99)
GLUCOSE SERPL-MCNC: 191 MG/DL — HIGH (ref 70–99)
HCT VFR BLD CALC: 26.1 % — LOW (ref 34.5–45)
HGB BLD-MCNC: 9 G/DL — LOW (ref 11.5–15.5)
MCHC RBC-ENTMCNC: 31.2 PG — SIGNIFICANT CHANGE UP (ref 27–34)
MCHC RBC-ENTMCNC: 34.5 GM/DL — SIGNIFICANT CHANGE UP (ref 32–36)
MCV RBC AUTO: 90.7 FL — SIGNIFICANT CHANGE UP (ref 80–100)
PLATELET # BLD AUTO: 225 K/UL — SIGNIFICANT CHANGE UP (ref 150–400)
POTASSIUM SERPL-MCNC: 5 MMOL/L — SIGNIFICANT CHANGE UP (ref 3.5–5.3)
POTASSIUM SERPL-SCNC: 5 MMOL/L — SIGNIFICANT CHANGE UP (ref 3.5–5.3)
RBC # BLD: 2.88 M/UL — LOW (ref 3.8–5.2)
RBC # FLD: 12.4 % — SIGNIFICANT CHANGE UP (ref 10.3–14.5)
SODIUM SERPL-SCNC: 138 MMOL/L — SIGNIFICANT CHANGE UP (ref 135–145)
WBC # BLD: 10.4 K/UL — SIGNIFICANT CHANGE UP (ref 3.8–10.5)
WBC # FLD AUTO: 10.4 K/UL — SIGNIFICANT CHANGE UP (ref 3.8–10.5)

## 2019-04-16 PROCEDURE — 87641 MR-STAPH DNA AMP PROBE: CPT

## 2019-04-16 PROCEDURE — 84295 ASSAY OF SERUM SODIUM: CPT

## 2019-04-16 PROCEDURE — P9016: CPT

## 2019-04-16 PROCEDURE — 94003 VENT MGMT INPAT SUBQ DAY: CPT

## 2019-04-16 PROCEDURE — 36430 TRANSFUSION BLD/BLD COMPNT: CPT

## 2019-04-16 PROCEDURE — 85730 THROMBOPLASTIN TIME PARTIAL: CPT

## 2019-04-16 PROCEDURE — 96374 THER/PROPH/DIAG INJ IV PUSH: CPT

## 2019-04-16 PROCEDURE — 86900 BLOOD TYPING SEROLOGIC ABO: CPT

## 2019-04-16 PROCEDURE — C1887: CPT

## 2019-04-16 PROCEDURE — 82435 ASSAY OF BLOOD CHLORIDE: CPT

## 2019-04-16 PROCEDURE — 83880 ASSAY OF NATRIURETIC PEPTIDE: CPT

## 2019-04-16 PROCEDURE — 82947 ASSAY GLUCOSE BLOOD QUANT: CPT

## 2019-04-16 PROCEDURE — 85014 HEMATOCRIT: CPT

## 2019-04-16 PROCEDURE — 71045 X-RAY EXAM CHEST 1 VIEW: CPT

## 2019-04-16 PROCEDURE — P9045: CPT

## 2019-04-16 PROCEDURE — C1751: CPT

## 2019-04-16 PROCEDURE — 82962 GLUCOSE BLOOD TEST: CPT

## 2019-04-16 PROCEDURE — 82330 ASSAY OF CALCIUM: CPT

## 2019-04-16 PROCEDURE — 84132 ASSAY OF SERUM POTASSIUM: CPT

## 2019-04-16 PROCEDURE — 94010 BREATHING CAPACITY TEST: CPT

## 2019-04-16 PROCEDURE — 84480 ASSAY TRIIODOTHYRONINE (T3): CPT

## 2019-04-16 PROCEDURE — 85576 BLOOD PLATELET AGGREGATION: CPT

## 2019-04-16 PROCEDURE — 93005 ELECTROCARDIOGRAM TRACING: CPT

## 2019-04-16 PROCEDURE — 80053 COMPREHEN METABOLIC PANEL: CPT

## 2019-04-16 PROCEDURE — 99285 EMERGENCY DEPT VISIT HI MDM: CPT | Mod: 25

## 2019-04-16 PROCEDURE — P9047: CPT

## 2019-04-16 PROCEDURE — 85610 PROTHROMBIN TIME: CPT

## 2019-04-16 PROCEDURE — 84443 ASSAY THYROID STIM HORMONE: CPT

## 2019-04-16 PROCEDURE — 97116 GAIT TRAINING THERAPY: CPT

## 2019-04-16 PROCEDURE — 85027 COMPLETE CBC AUTOMATED: CPT

## 2019-04-16 PROCEDURE — 93880 EXTRACRANIAL BILAT STUDY: CPT

## 2019-04-16 PROCEDURE — 80048 BASIC METABOLIC PNL TOTAL CA: CPT

## 2019-04-16 PROCEDURE — 84100 ASSAY OF PHOSPHORUS: CPT

## 2019-04-16 PROCEDURE — C1894: CPT

## 2019-04-16 PROCEDURE — 93306 TTE W/DOPPLER COMPLETE: CPT

## 2019-04-16 PROCEDURE — 84436 ASSAY OF TOTAL THYROXINE: CPT

## 2019-04-16 PROCEDURE — 93458 L HRT ARTERY/VENTRICLE ANGIO: CPT

## 2019-04-16 PROCEDURE — 86901 BLOOD TYPING SEROLOGIC RH(D): CPT

## 2019-04-16 PROCEDURE — C1889: CPT

## 2019-04-16 PROCEDURE — 94002 VENT MGMT INPAT INIT DAY: CPT

## 2019-04-16 PROCEDURE — C1769: CPT

## 2019-04-16 PROCEDURE — 80061 LIPID PANEL: CPT

## 2019-04-16 PROCEDURE — 82565 ASSAY OF CREATININE: CPT

## 2019-04-16 PROCEDURE — 83735 ASSAY OF MAGNESIUM: CPT

## 2019-04-16 PROCEDURE — 82553 CREATINE MB FRACTION: CPT

## 2019-04-16 PROCEDURE — 84484 ASSAY OF TROPONIN QUANT: CPT

## 2019-04-16 PROCEDURE — 86850 RBC ANTIBODY SCREEN: CPT

## 2019-04-16 PROCEDURE — 86891 AUTOLOGOUS BLOOD OP SALVAGE: CPT

## 2019-04-16 PROCEDURE — 86923 COMPATIBILITY TEST ELECTRIC: CPT

## 2019-04-16 PROCEDURE — 83036 HEMOGLOBIN GLYCOSYLATED A1C: CPT

## 2019-04-16 PROCEDURE — 83605 ASSAY OF LACTIC ACID: CPT

## 2019-04-16 PROCEDURE — 87640 STAPH A DNA AMP PROBE: CPT

## 2019-04-16 PROCEDURE — 82803 BLOOD GASES ANY COMBINATION: CPT

## 2019-04-16 PROCEDURE — 99152 MOD SED SAME PHYS/QHP 5/>YRS: CPT

## 2019-04-16 PROCEDURE — 85384 FIBRINOGEN ACTIVITY: CPT

## 2019-04-16 PROCEDURE — 97161 PT EVAL LOW COMPLEX 20 MIN: CPT

## 2019-04-16 PROCEDURE — 82550 ASSAY OF CK (CPK): CPT

## 2019-04-16 RX ORDER — METFORMIN HYDROCHLORIDE 850 MG/1
1 TABLET ORAL
Qty: 0 | Refills: 0 | COMMUNITY

## 2019-04-16 RX ORDER — POLYETHYLENE GLYCOL 3350 17 G/17G
17 POWDER, FOR SOLUTION ORAL
Qty: 0 | Refills: 0 | COMMUNITY
Start: 2019-04-16

## 2019-04-16 RX ORDER — FAMOTIDINE 10 MG/ML
1 INJECTION INTRAVENOUS
Qty: 0 | Refills: 0 | COMMUNITY
Start: 2019-04-16

## 2019-04-16 RX ORDER — METFORMIN HYDROCHLORIDE 850 MG/1
1 TABLET ORAL
Qty: 0 | Refills: 0 | COMMUNITY
Start: 2019-04-16

## 2019-04-16 RX ORDER — METOPROLOL TARTRATE 50 MG
1 TABLET ORAL
Qty: 0 | Refills: 0 | COMMUNITY
Start: 2019-04-16

## 2019-04-16 RX ORDER — DOCUSATE SODIUM 100 MG
1 CAPSULE ORAL
Qty: 0 | Refills: 0 | COMMUNITY
Start: 2019-04-16

## 2019-04-16 RX ORDER — SIMVASTATIN 20 MG/1
1 TABLET, FILM COATED ORAL
Qty: 0 | Refills: 0 | COMMUNITY

## 2019-04-16 RX ORDER — ATORVASTATIN CALCIUM 80 MG/1
1 TABLET, FILM COATED ORAL
Qty: 0 | Refills: 0 | COMMUNITY
Start: 2019-04-16

## 2019-04-16 RX ORDER — LISINOPRIL 2.5 MG/1
1 TABLET ORAL
Qty: 0 | Refills: 0 | COMMUNITY
Start: 2019-04-16

## 2019-04-16 RX ORDER — ASPIRIN/CALCIUM CARB/MAGNESIUM 324 MG
1 TABLET ORAL
Qty: 0 | Refills: 0 | COMMUNITY
Start: 2019-04-16

## 2019-04-16 RX ORDER — LEVOTHYROXINE SODIUM 125 MCG
12.5 TABLET ORAL
Qty: 0 | Refills: 0 | COMMUNITY
Start: 2019-04-16

## 2019-04-16 RX ADMIN — Medication 50 MILLIGRAM(S): at 05:15

## 2019-04-16 RX ADMIN — METFORMIN HYDROCHLORIDE 1000 MILLIGRAM(S): 850 TABLET ORAL at 12:21

## 2019-04-16 RX ADMIN — POLYETHYLENE GLYCOL 3350 17 GRAM(S): 17 POWDER, FOR SOLUTION ORAL at 12:20

## 2019-04-16 RX ADMIN — Medication 100 MILLIGRAM(S): at 05:15

## 2019-04-16 RX ADMIN — SODIUM CHLORIDE 3 MILLILITER(S): 9 INJECTION INTRAMUSCULAR; INTRAVENOUS; SUBCUTANEOUS at 05:20

## 2019-04-16 RX ADMIN — Medication 12.5 MICROGRAM(S): at 05:15

## 2019-04-16 RX ADMIN — Medication 81 MILLIGRAM(S): at 12:21

## 2019-04-16 RX ADMIN — Medication 5: at 08:07

## 2019-04-16 RX ADMIN — Medication 5: at 12:23

## 2019-04-16 RX ADMIN — LISINOPRIL 5 MILLIGRAM(S): 2.5 TABLET ORAL at 05:15

## 2019-04-16 RX ADMIN — FAMOTIDINE 20 MILLIGRAM(S): 10 INJECTION INTRAVENOUS at 05:15

## 2019-04-16 RX ADMIN — HEPARIN SODIUM 5000 UNIT(S): 5000 INJECTION INTRAVENOUS; SUBCUTANEOUS at 05:15

## 2019-04-16 NOTE — DISCHARGE NOTE PROVIDER - INSTRUCTIONS
diabetic diet, no added salt, low cholesterol diet  maintain glucose < 150  ck glucose before breakfast and dinner

## 2019-04-16 NOTE — DISCHARGE NOTE PROVIDER - NSDCPNSUBOBJ_GEN_ALL_CORE
34 minutes spent on discharge        PE/ Tele  SR 82 VSS    Cor: S1S2    lungs CTA    MT CDI    Abd: soft nt/nd + BS + BM    ext L leg inc CDI no edema , no calf tenderness

## 2019-04-16 NOTE — DISCHARGE NOTE NURSING/CASE MANAGEMENT/SOCIAL WORK - NSDCDPATPORTLINK_GEN_ALL_CORE
You can access the [facility], offered by [facility], by registering with the following website: [email or web address]

## 2019-04-16 NOTE — DISCHARGE NOTE PROVIDER - HOSPITAL COURSE
83 F PMH T2DM, HLD, Vit D deficiency, p/w one episode of acute onset chest pain today. Mid sternal, non radiating, sharp, precipitated by exertion (pt was carrying heavy grocery bags) and relieved by rest. Never had similar sx before. Denies associated sob, palpitations, LE edema, orthopnea.  NO prior provocative testing, no prior aspirin use. Unk baseline ekg. +FH early CAD with MI (brother, mother both with MI in 50s). Lifelong nonsmoker, no etoh or other toxic habits. Pt presented to [facility] where EKG showed signs of inferolateral ischemia, and troponin was elevated. s/p aspirin load, plavix load, hep gtt, and transferred to [facility] for cards eval and cardiac cath. Currently chest pain free, resting comfortably.     s/p 4/12/19 C3L    POD # 3    postop course unremarkable    4/14 tx floor    5/15 VSS; pw isolated; +HTN - lisinipril initiated and bp improved this afteroon, bb increased to lopressor 50 mg po bid    Discharge planning- home PT recommended but patient wants rehab -d/c to rehab 4/16

## 2019-04-16 NOTE — DISCHARGE NOTE PROVIDER - NSDCCPCAREPLAN_GEN_ALL_CORE_FT
PRINCIPAL DISCHARGE DIAGNOSIS  Diagnosis: S/P CABG x 3  Assessment and Plan of Treatment: daily weights - report weight gain > 2.5 pounds overnight to covering MD/NP/PA  daily shower  follow up appt with Dr. Sean Barba, cardiac surgeon when pt leaves rehab - [phone]  follow up with Primary care MD and/or Cardiologist following rehab stay

## 2019-04-16 NOTE — DISCHARGE NOTE PROVIDER - CARE PROVIDER_API CALL
Sean Barba)  Thoracic and Cardiac Surgery  [address] [place] [address]  Phone: [phone]  Fax: [phone]  Follow Up Time:

## 2019-04-17 DIAGNOSIS — Z76.89 PERSONS ENCOUNTERING HEALTH SERVICES IN OTHER SPECIFIED CIRCUMSTANCES: ICD-10-CM

## 2019-06-01 PROCEDURE — G9005: CPT

## 2022-02-28 NOTE — PRE-ANESTHESIA EVALUATION ADULT - WEIGHT IN KG
LOV 10/2021, NEXT APPT 10/2022 for PHYS    Too soon to fill       HOLD UNTIL 03/02/2022    **PT REQ TO BE SENT TO [facility] IN [place]**
74.2

## 2023-12-04 NOTE — PRE-ANESTHESIA EVALUATION ADULT - NSANTHTOBACCOSD_GEN_ALL_CORE
"  Physical Medicine & Rehabilitation Progress Note    Encounter Date: 12/4/2023    Chief Complaint: Doing ok    Interval Events (Subjective):  VSS  BM 12/2  Voiding    Seen and examined in gym with Vielka and therapy. Working on UE strengthening and dexterity. Jason states that he is doing well. Vielka has questions regarding paperwork and Botox.     ROS: 14 point ROS negative unless otherwise specified in the HPI    Objective:  VITAL SIGNS: /79   Pulse 76   Temp 36.8 °C (98.3 °F) (Oral)   Resp 18   Ht 1.727 m (5' 8\")   Wt 87 kg (191 lb 12.8 oz)   SpO2 96%   BMI 29.16 kg/m²     GEN: No apparent distress  HEENT: Head normocephalic, atraumatic.  Sclera nonicteric bilaterally, no ocular discharge appreciated bilaterally.  CV: Extremities warm and well-perfused, no peripheral edema appreciated bilaterally.  PULMONARY: Breathing nonlabored on room air, no respiratory accessory muscle use.  Not requiring supplemental oxygen.  SKIN: No appreciable skin breakdown on exposed areas of skin.  PSYCH: Normal affect  NEURO: Awake alert.  Conversational.  Logical thought content. Dysarthria. Left Hemiparesis. Tone in left wrist, fingers 2/4 and in bicep 1+/4      Laboratory Values:  Recent Results (from the past 72 hour(s))   Basic Metabolic Panel    Collection Time: 12/02/23  6:21 AM   Result Value Ref Range    Sodium 138 135 - 145 mmol/L    Potassium 3.7 3.6 - 5.5 mmol/L    Chloride 104 96 - 112 mmol/L    Co2 25 20 - 33 mmol/L    Glucose 141 (H) 65 - 99 mg/dL    Bun 35 (H) 8 - 22 mg/dL    Creatinine 2.58 (H) 0.50 - 1.40 mg/dL    Calcium 9.0 8.5 - 10.5 mg/dL    Anion Gap 9.0 7.0 - 16.0   MAGNESIUM    Collection Time: 12/02/23  6:21 AM   Result Value Ref Range    Magnesium 2.1 1.5 - 2.5 mg/dL   PHOSPHORUS    Collection Time: 12/02/23  6:21 AM   Result Value Ref Range    Phosphorus 2.9 2.5 - 4.5 mg/dL   ESTIMATED GFR    Collection Time: 12/02/23  6:21 AM   Result Value Ref Range    GFR (CKD-EPI) 27 (A) >60 mL/min/1.73 m " 2         Medications:  Scheduled Medications   Medication Dose Frequency    baclofen  5 mg TID    amLODIPine  10 mg DAILY    ciprofloxacin  1 Drop Q4HRS WHILE AWAKE    diclofenac sodium  2 g 4X/DAY    sertraline  25 mg DAILY    apixaban  5 mg BID    hydrALAZINE  100 mg Q8HRS    traZODone  75 mg QHS    senna-docusate  2 Tablet BID    omeprazole  20 mg DAILY    atorvastatin  40 mg Nightly     PRN medications: carboxymethylcellulose, melatonin, [DISCONTINUED] insulin regular **AND** [CANCELED] POC blood glucose manual result **AND** NOTIFY MD and PharmD **AND** Administer 20 grams of glucose (approximately 8 ounces of fruit juice) every 15 minutes PRN FSBG less than 70 mg/dL **AND** dextrose bolus, Respiratory Therapy Consult, senna-docusate **AND** polyethylene glycol/lytes **AND** magnesium hydroxide **AND** bisacodyl, mag hydrox-al hydrox-simeth, ondansetron **OR** ondansetron, sodium chloride, [] acetaminophen **FOLLOWED BY** acetaminophen, oxyCODONE immediate-release **OR** oxyCODONE immediate-release, hydrALAZINE    Diet:  Current Diet Order   Procedures    Diet Order Diet: Consistent CHO (Diabetic) (2 gram sodium restriction; medications whole with thin liquids); Second Modifier: (optional): 2 Gram Sodium       Medical Decision Making and Plan:  Right thalamic hemorrhagic stroke ~ last week October   Originally presented with a headache and left-sided weakness to hospital in [facility]  Work-up at the outside hospital in [facility] revealed a right thalamic hemorrhagic stroke  Repeat CT head done after return flight to the ,  CT head shows right thalamic hemorrhage, decrease in density since prior studies from the outside hospital, slight asymmetric dilation of the left ventricular system including the left temporal horn with a possible component of hydrocephalus  Planning for BP less than 140, avoiding any kind of anticoagulation  Initiate comprehensive IRF level therapy with 3 days of therapy 5  days a week with services from PT/OT/SLP     Spasticity  Continue baclofen 5 mg nightly, started on 11/11 --> increase to TID 11/13/2023 --> continue 11/14/2023, stable on higher dose.   PRAFO ordered for right lower extremity to prevent further plantarflexion contracture  Spasticity controlled, continue Baclofen 11/20/2023   Consider weaning 11/21/2023   Stop 11/24/2023 - monitor for worsening spasticity  Has had resurgence of spasticity restart baclofen 5mg TID 11/30/2023   OP follow up with Physiatry for consideration Botox     ABLA  Now on Eliquis  Recheck 11/28 - improved 11.4--> 12.0--> 11.6 11/30/2023      CKD  Has seen nephrology in past  Improving 11/13/2023   F/u OP with nephrology  S/p IVF 11/13-11/14 11/14/2023 BUN and Cr improved compared to prior  BMP 11/16 - improved - currently receiving IVF  Recheck [facility] 11/21 - Slightly worsened renal function - likely baseline  BUN/Cr stable 11/24/2023   BUN/Cr stable in 30's/3's - s/p 1L IVF     Hypertension  Continue Amlodipine 10mg daily  Continue Hydralazine 25mg TID - increased by hospitalist 11/13/2023 from BID to TID--> increased to 50mg q8hrs 11/15  11/16 Hydralazine increased to 75mg q8hrs and 1x Hydralazine given  11/17 BP still elevated but hydralazine recently increased. Monitor  12/4/2023 VSS Continue Hydralazine 100mg q8hrs + Amlodipine 10mg daily     Prediabetes  Discontinue SSI     HLD  Continue home dose satin      Bowel  Constipation 11/29/2023, resolved 11/30/2023   Continue with scheduled Colace and senna, as needed stool softeners  Miralax x3 doses 11/29/2023 --> Constipation Resolved 11/30/2023   Last BM 12/2     Insomnia  Secondary to recent jet lag, melatonin scheduled --> increase to home dose 11/13/2023 9mg  Utilize trazodone as needed insomnia continues  Schedule Trazodone 11/15/2023   11/16/2023 continue Trazodone as is. Thinks he slept better last night  11/17/2023 Still not sleeping well. Increase to 75mg nightly.   12/4/2023 continue  trazodone at current dose     Pain -as needed Tylenol and oxycodone    Post-Stroke Depression  Consulted Pyschiatry   Start Prozac 11/28/2023 --> Switched to zoloft per psychiatry  Appreciate assistance with management    Right Foot Pain  H/o Gout  Xray with swelling 1st metatarsal head   Trial Colchicine for acute gout flare 11/28/2023   Topical Voltaren gel scheduled   Improved with less swelling, erythema 11/29/2023   Resolved      DVT PROPHYLAXIS: NO - Hemorrhagic stroke. US + UE and LE for brachial and peroneal DVT. 11/21/2023 start Heparin 5000 units q8hrs SQ for further prophylaxis, if tolerates will increase to full AC. Consider repeat CTH to ensure stability bleed once on full AC. 11/24/2023 Start loading dose Eliquis 10mg BID x 7 days with transition to 5mg BID. CBC showing improvement in H/H 11/28/2023 no neurologic decline.     HOSPITALIST FOLLOWING: YES - d/w hospitalist 12/4    CODE STATUS: FULL CODE    DISPO: Home alone. Vielka and patient not . D/w CM to give resources for private care giving. 11/29/2023 Patient making significant progress with therapy and would benefit from more time in acute to maximize neuro recovery. Family actively looking for Caregivers for 24/7 care. Discharge extended due to measurable progress.     MARCUS: 12/12/23    MEDS SENT TO: TBD    DISCHARGE FOLLOW UP: Neurology, Nephrology, PCP, Physiatry (Botox) - needs referral to all.   ____________________________________    Dr. Lisa Lee DO, MS  [facility] - Physical Medicine & Rehabilitation   ____________________________________     No

## 2024-11-16 NOTE — PATIENT PROFILE ADULT - EQUIPMENT CURRENTLY USED AT HOME
[facility] Medicine Progress Note  V 10.25      Date of Admission: 11/7/2024    Hospital Day: 10      Chief Admission Complaint:  fall, back pain     Subjective:  Patient seen and examined at bedside. No acute events overnight. Patient seen following MBS. Reports significant back pain at this time related to moving him around. States breathing feels okay.     Presenting Admission History:       Mr. Juan Mohamud is a 84 y.o. male with a medical hx significant for type II DM, degenerative lumbar disease, arrhythmia s/p AICD, diastolic CHF, and macular degeneration otherwise as listed in the MHx table below, who presented from home to the [facility] ED on 11/3/24 after a fall.     Per chart review, patient fell after feeling like his \"knees were giving out.\" No reported symptoms prior to this fall, patient landed on his left side and hit the back of his head but did not lose consciousness at this time.      Upon arrival to the [facility] ED, patient was alert, oriented and reported with GCS of 15 and NIH of 0. While in the ED patient had increasing generalized weakness and difficulty getting up from a sitting position. Decision was made to admit patient as they did not feel safe discharging him home given his inability to stand unassisted.      Fall was thought to be mechanical and secondary to generalized weakness due to poor nutrition. CT abd pelv performed due to complaints of abdominal pain, distension, and back pain upon arrival to the floor. Imaging revealed acute T11 vertebral fracture extending into the T10-T11 disc space as well as diffuse distention of small and large bowel compatible with ileus and bilateral pleural effusions.     GI consulted for ileus, and patient underwent colonic decompression     MRI thoracic spine then demonstrated large epidural spinal hematoma with cord compression  and additional T6 subacute compression fracture along with bilateral pleural effusions.     Patient was  Value Date/Time    ORG Klebsiella pneumoniae 03/27/2024 12:46 PM         Baron Alaniz, DO     no
